# Patient Record
Sex: MALE | Race: WHITE
[De-identification: names, ages, dates, MRNs, and addresses within clinical notes are randomized per-mention and may not be internally consistent; named-entity substitution may affect disease eponyms.]

---

## 2023-01-26 PROBLEM — Z00.00 ENCOUNTER FOR PREVENTIVE HEALTH EXAMINATION: Status: ACTIVE | Noted: 2023-01-26

## 2023-02-02 ENCOUNTER — APPOINTMENT (OUTPATIENT)
Dept: CARDIOLOGY | Facility: CLINIC | Age: 83
End: 2023-02-02
Payer: MEDICARE

## 2023-02-02 ENCOUNTER — NON-APPOINTMENT (OUTPATIENT)
Age: 83
End: 2023-02-02

## 2023-02-02 VITALS
BODY MASS INDEX: 28.7 KG/M2 | HEIGHT: 71 IN | TEMPERATURE: 98 F | WEIGHT: 205 LBS | OXYGEN SATURATION: 95 % | SYSTOLIC BLOOD PRESSURE: 139 MMHG | DIASTOLIC BLOOD PRESSURE: 85 MMHG | HEART RATE: 66 BPM

## 2023-02-02 PROCEDURE — 99205 OFFICE O/P NEW HI 60 MIN: CPT | Mod: 25

## 2023-02-02 PROCEDURE — 93000 ELECTROCARDIOGRAM COMPLETE: CPT | Mod: 59

## 2023-02-02 PROCEDURE — 93242 EXT ECG>48HR<7D RECORDING: CPT

## 2023-02-02 RX ORDER — HYDROCHLOROTHIAZIDE 12.5 MG/1
12.5 CAPSULE ORAL
Refills: 0 | Status: ACTIVE | COMMUNITY

## 2023-02-02 RX ORDER — ATORVASTATIN CALCIUM 40 MG/1
40 TABLET, FILM COATED ORAL
Refills: 0 | Status: DISCONTINUED | COMMUNITY
End: 2023-02-02

## 2023-02-02 NOTE — HISTORY OF PRESENT ILLNESS
[FreeTextEntry1] : BETO CAICEDO 82 year M BMI  29 , presents with HTN HLD and reports  no dyspnea, chest pain, palpitations, syncope, claudication mild peripheral edema.  Never  Tobacco.  Meds HCTZ, losartan, atorvastatin, ASA. May stop statin for age. Notably MI 2003 cath at [facility] no stent though reportedly 80% stenosis identified. Incarcerated in [place] [address]86[address]. [facility] to [facility]oid but deferred due to bradycardia and PM recommended. He denies recent syncope. Echo CT chest lexiscan nuc Holter pending. TSH and blood work. RTC 6w.  \par Total visit time 45min.NSR 65 RBBB  off beta blocker.\par

## 2023-02-02 NOTE — PHYSICAL EXAM
[Well Developed] : well developed [Well Nourished] : well nourished [No Acute Distress] : no acute distress [Normal Conjunctiva] : normal conjunctiva [Normal Venous Pressure] : normal venous pressure [No Carotid Bruit] : no carotid bruit [Normal S1, S2] : normal S1, S2 [No Murmur] : no murmur [No Rub] : no rub [No Gallop] : no gallop [Clear Lung Fields] : clear lung fields [Good Air Entry] : good air entry [No Respiratory Distress] : no respiratory distress  [Soft] : abdomen soft [Non Tender] : non-tender [No Masses/organomegaly] : no masses/organomegaly [Normal Bowel Sounds] : normal bowel sounds [Normal Gait] : normal gait [No Edema] : no edema [No Cyanosis] : no cyanosis [No Clubbing] : no clubbing [No Varicosities] : no varicosities [No Rash] : no rash [No Skin Lesions] : no skin lesions [Moves all extremities] : moves all extremities [No Focal Deficits] : no focal deficits [Normal Speech] : normal speech [Alert and Oriented] : alert and oriented [Normal memory] : normal memory [Wheeze ____] : wheeze [unfilled] [de-identified] : audible mild expiratory bilat wheeze

## 2023-02-05 LAB
ALBUMIN SERPL ELPH-MCNC: 4.5 G/DL
ALP BLD-CCNC: 80 U/L
ALT SERPL-CCNC: 15 U/L
ANION GAP SERPL CALC-SCNC: 11 MMOL/L
AST SERPL-CCNC: 15 U/L
BASOPHILS # BLD AUTO: 0.05 K/UL
BASOPHILS NFR BLD AUTO: 0.5 %
BILIRUB SERPL-MCNC: 0.4 MG/DL
BUN SERPL-MCNC: 29 MG/DL
CALCIUM SERPL-MCNC: 9.8 MG/DL
CHLORIDE SERPL-SCNC: 103 MMOL/L
CO2 SERPL-SCNC: 24 MMOL/L
CREAT SERPL-MCNC: 1.36 MG/DL
EGFR: 52 ML/MIN/1.73M2
EOSINOPHIL # BLD AUTO: 0.2 K/UL
EOSINOPHIL NFR BLD AUTO: 2.1 %
GLUCOSE SERPL-MCNC: 92 MG/DL
HCT VFR BLD CALC: 45.2 %
HGB BLD-MCNC: 14.3 G/DL
IMM GRANULOCYTES NFR BLD AUTO: 0.6 %
LYMPHOCYTES # BLD AUTO: 1.71 K/UL
LYMPHOCYTES NFR BLD AUTO: 17.8 %
MAN DIFF?: NORMAL
MCHC RBC-ENTMCNC: 27.2 PG
MCHC RBC-ENTMCNC: 31.6 GM/DL
MCV RBC AUTO: 86.1 FL
MONOCYTES # BLD AUTO: 0.73 K/UL
MONOCYTES NFR BLD AUTO: 7.6 %
NEUTROPHILS # BLD AUTO: 6.83 K/UL
NEUTROPHILS NFR BLD AUTO: 71.4 %
NT-PROBNP SERPL-MCNC: 236 PG/ML
PLATELET # BLD AUTO: 339 K/UL
POTASSIUM SERPL-SCNC: 4.6 MMOL/L
PROT SERPL-MCNC: 6.6 G/DL
RBC # BLD: 5.25 M/UL
RBC # FLD: 14.3 %
SODIUM SERPL-SCNC: 139 MMOL/L
TSH SERPL-ACNC: 4.14 UIU/ML
WBC # FLD AUTO: 9.58 K/UL

## 2023-02-15 ENCOUNTER — APPOINTMENT (OUTPATIENT)
Dept: CARDIOLOGY | Facility: CLINIC | Age: 83
End: 2023-02-15
Payer: MEDICARE

## 2023-02-15 PROCEDURE — 93306 TTE W/DOPPLER COMPLETE: CPT

## 2023-03-01 ENCOUNTER — APPOINTMENT (OUTPATIENT)
Dept: CARDIOLOGY | Facility: CLINIC | Age: 83
End: 2023-03-01
Payer: MEDICARE

## 2023-03-01 ENCOUNTER — MED ADMIN CHARGE (OUTPATIENT)
Age: 83
End: 2023-03-01

## 2023-03-01 DIAGNOSIS — R42 DIZZINESS AND GIDDINESS: ICD-10-CM

## 2023-03-01 PROCEDURE — 93015 CV STRESS TEST SUPVJ I&R: CPT

## 2023-03-01 PROCEDURE — A9500: CPT

## 2023-03-01 PROCEDURE — 78452 HT MUSCLE IMAGE SPECT MULT: CPT

## 2023-03-01 RX ORDER — REGADENOSON 0.08 MG/ML
0.4 INJECTION, SOLUTION INTRAVENOUS
Qty: 1 | Refills: 0 | Status: COMPLETED | OUTPATIENT
Start: 2023-03-01

## 2023-03-01 RX ADMIN — REGADENOSON 4 MG/5ML: 0.08 INJECTION, SOLUTION INTRAVENOUS at 00:00

## 2023-03-29 ENCOUNTER — APPOINTMENT (OUTPATIENT)
Dept: CARDIOLOGY | Facility: CLINIC | Age: 83
End: 2023-03-29
Payer: MEDICARE

## 2023-03-29 ENCOUNTER — LABORATORY RESULT (OUTPATIENT)
Age: 83
End: 2023-03-29

## 2023-03-29 VITALS
TEMPERATURE: 97.6 F | BODY MASS INDEX: 29.4 KG/M2 | HEIGHT: 71 IN | OXYGEN SATURATION: 95 % | HEART RATE: 72 BPM | DIASTOLIC BLOOD PRESSURE: 84 MMHG | SYSTOLIC BLOOD PRESSURE: 146 MMHG | WEIGHT: 210 LBS

## 2023-03-29 DIAGNOSIS — E78.5 HYPERLIPIDEMIA, UNSPECIFIED: ICD-10-CM

## 2023-03-29 PROCEDURE — 99215 OFFICE O/P EST HI 40 MIN: CPT

## 2023-03-29 NOTE — PHYSICAL EXAM
[Well Developed] : well developed [Well Nourished] : well nourished [No Acute Distress] : no acute distress [Normal Conjunctiva] : normal conjunctiva [Normal Venous Pressure] : normal venous pressure [No Carotid Bruit] : no carotid bruit [Normal S1, S2] : normal S1, S2 [No Murmur] : no murmur [No Rub] : no rub [No Gallop] : no gallop [Clear Lung Fields] : clear lung fields [Good Air Entry] : good air entry [No Respiratory Distress] : no respiratory distress  [Wheeze ____] : wheeze [unfilled] [Soft] : abdomen soft [Non Tender] : non-tender [No Masses/organomegaly] : no masses/organomegaly [Normal Bowel Sounds] : normal bowel sounds [Normal Gait] : normal gait [No Edema] : no edema [No Cyanosis] : no cyanosis [No Clubbing] : no clubbing [No Varicosities] : no varicosities [No Rash] : no rash [No Skin Lesions] : no skin lesions [Moves all extremities] : moves all extremities [No Focal Deficits] : no focal deficits [Normal Speech] : normal speech [Alert and Oriented] : alert and oriented [Normal memory] : normal memory [de-identified] : audible mild expiratory bilat wheeze

## 2023-03-29 NOTE — HISTORY OF PRESENT ILLNESS
[FreeTextEntry1] : BETO CAICEDO 82 year M BMI  29 , presents with HTN HLD and reports  no dyspnea, chest pain, palpitations, syncope, claudication mild peripheral edema.  Never  Tobacco.  Meds HCTZ, losartan, atorvastatin, ASA. May stop statin for age. Notably MI 2003 cath at [facility] no stent though reportedly 80% stenosis identified. [place]arcerated in [place] [address]. [facility] to [facility] but deferred due to bradycardia and PM recommended. He denies recent syncope. 2/15/23 Echo LVEF 72% MAC mild aorta 44mm and  AR mild moderate.; CT chest 3/1/23 lexiscan nuc neg/neg isch/inf EF 67%. Holter 43 pauses >2 sec longest  2% isolated PVC;  2.2 sec pause 3:07 AM TSH and blood work. RTC 6w.  Cr 1.36. RBBB 1oAVB Cr 1.3 LVEF 72% AR and aortic dilatation amyloid investigation CT chest for aortic size. Some mention of sarcoid on prior chest CT\par Total visit time 45min.NSR 65 RBBB  off beta blocker. CT 2/10/23 /84 HR 72 BPM\par

## 2023-04-03 LAB
ALBUMIN MFR SERPL ELPH: 62.7 %
ALBUMIN SERPL-MCNC: 4 G/DL
ALBUMIN/GLOB SERPL: 1.7 RATIO
ALPHA1 GLOB MFR SERPL ELPH: 5 %
ALPHA1 GLOB SERPL ELPH-MCNC: 0.3 G/DL
ALPHA2 GLOB MFR SERPL ELPH: 11.6 %
ALPHA2 GLOB SERPL ELPH-MCNC: 0.7 G/DL
B-GLOBULIN MFR SERPL ELPH: 11.6 %
B-GLOBULIN SERPL ELPH-MCNC: 0.7 G/DL
GAMMA GLOB FLD ELPH-MCNC: 0.6 G/DL
GAMMA GLOB MFR SERPL ELPH: 9.1 %
INTERPRETATION SERPL IEP-IMP: NORMAL
PROT SERPL-MCNC: 6.3 G/DL
PROT SERPL-MCNC: 6.3 G/DL

## 2023-04-05 ENCOUNTER — NON-APPOINTMENT (OUTPATIENT)
Age: 83
End: 2023-04-05

## 2023-04-10 LAB
ALBUPE: 22.4 %
ALPHA1UPE: 35.5 %
ALPHA2UPE: 19.2 %
BENCE JONES EXCRETION: 0 MG/24HR
BETAUPE: 16 %
CREAT 24H UR-MCNC: 1.3 G/24 H
CREATININE UR (MAYO): 149 MG/DL
GAMMAUPE: 6.9 %
IGA 24H UR QL IFE: NORMAL
KAPPA LC 24H UR QL: 0 MG/DL
M PROTEIN 24H MFR UR ELPH: 0 %
PROT ?TM UR-MCNC: 24 HR
PROT PATTERN 24H UR ELPH-IMP: NORMAL
PROT UR-MCNC: 10 MG/DL
PROT UR-MCNC: 10 MG/DL
SPECIMEN VOL 24H UR: 875 ML
U PROTEIN QNT CALCULATION: 88 MG/24 H

## 2023-05-18 ENCOUNTER — APPOINTMENT (OUTPATIENT)
Dept: PULMONOLOGY | Facility: CLINIC | Age: 83
End: 2023-05-18
Payer: MEDICARE

## 2023-05-18 VITALS
OXYGEN SATURATION: 98 % | SYSTOLIC BLOOD PRESSURE: 133 MMHG | HEART RATE: 77 BPM | HEIGHT: 71 IN | BODY MASS INDEX: 29.68 KG/M2 | WEIGHT: 212 LBS | DIASTOLIC BLOOD PRESSURE: 80 MMHG | TEMPERATURE: 98 F

## 2023-05-18 DIAGNOSIS — Z85.46 PERSONAL HISTORY OF MALIGNANT NEOPLASM OF PROSTATE: ICD-10-CM

## 2023-05-18 DIAGNOSIS — Z86.79 PERSONAL HISTORY OF OTHER DISEASES OF THE CIRCULATORY SYSTEM: ICD-10-CM

## 2023-05-18 DIAGNOSIS — Z86.39 PERSONAL HISTORY OF OTHER ENDOCRINE, NUTRITIONAL AND METABOLIC DISEASE: ICD-10-CM

## 2023-05-18 DIAGNOSIS — Z78.9 OTHER SPECIFIED HEALTH STATUS: ICD-10-CM

## 2023-05-18 LAB — POCT - HEMOGLOBIN (HGB), QUANTITATIVE, TRANSCUTANEOUS: 14.8

## 2023-05-18 PROCEDURE — 94729 DIFFUSING CAPACITY: CPT

## 2023-05-18 PROCEDURE — 94010 BREATHING CAPACITY TEST: CPT

## 2023-05-18 PROCEDURE — ZZZZZ: CPT

## 2023-05-18 PROCEDURE — 99204 OFFICE O/P NEW MOD 45 MIN: CPT | Mod: 25

## 2023-05-18 PROCEDURE — 95012 NITRIC OXIDE EXP GAS DETER: CPT

## 2023-05-18 PROCEDURE — 88738 HGB QUANT TRANSCUTANEOUS: CPT

## 2023-05-18 PROCEDURE — 94727 GAS DIL/WSHOT DETER LNG VOL: CPT

## 2023-05-18 RX ORDER — PANTOPRAZOLE 40 MG/1
40 TABLET, DELAYED RELEASE ORAL DAILY
Qty: 30 | Refills: 0 | Status: ACTIVE | COMMUNITY
Start: 2023-05-18 | End: 1900-01-01

## 2023-05-18 NOTE — HISTORY OF PRESENT ILLNESS
[Never] : never [TextBox_4] : EBTO CAICEDO 82 year Male with PMH:  HTN HLD , never smoker, MI 2003, abnormal ct chest in the past \par he was told he has sarcoid prior to 2005\par has been incarcerated from 2005 to 2022\par never seen pulm outpatient\par \par [facility] to exclude [facility]oid but deferred due to bradycardia and PM recommended.\par saw cardio- s/p tte, s/p ct chest at [facility] done last month, s/p stress test nuclear\par \par now- he had had about 5-6 months of cough. dry cough usually after food cough\par usually in the morning\par he has some dyspnea- usually when sleeping\par + snoring\par \par \par  \par

## 2023-05-18 NOTE — PROCEDURE
[FreeTextEntry1] : niox 21\par normal spirometry, normal dlco and normal volumes\par \par ct chest 2/2023 [facility] reviewed\par ct chest 5/2023 [facility] reviewed\par

## 2023-05-18 NOTE — REASON FOR VISIT
[Initial] : an initial visit [Abnormal CXR/ Chest CT] : an abnormal CXR/ chest CT [Shortness of Breath] : shortness of breath [Spouse] : spouse

## 2023-08-03 ENCOUNTER — APPOINTMENT (OUTPATIENT)
Dept: CARDIOLOGY | Facility: CLINIC | Age: 83
End: 2023-08-03
Payer: MEDICARE

## 2023-08-03 ENCOUNTER — NON-APPOINTMENT (OUTPATIENT)
Age: 83
End: 2023-08-03

## 2023-08-03 VITALS
HEART RATE: 74 BPM | WEIGHT: 211 LBS | HEIGHT: 71 IN | BODY MASS INDEX: 29.54 KG/M2 | OXYGEN SATURATION: 94 % | SYSTOLIC BLOOD PRESSURE: 146 MMHG | TEMPERATURE: 97.3 F | DIASTOLIC BLOOD PRESSURE: 89 MMHG

## 2023-08-03 DIAGNOSIS — I11.9 HYPERTENSIVE HEART DISEASE W/OUT HEART FAILURE: ICD-10-CM

## 2023-08-03 DIAGNOSIS — R94.31 ABNORMAL ELECTROCARDIOGRAM [ECG] [EKG]: ICD-10-CM

## 2023-08-03 DIAGNOSIS — I43 HYPERTENSIVE HEART DISEASE W/OUT HEART FAILURE: ICD-10-CM

## 2023-08-03 PROCEDURE — 93000 ELECTROCARDIOGRAM COMPLETE: CPT

## 2023-08-03 PROCEDURE — 99215 OFFICE O/P EST HI 40 MIN: CPT | Mod: 25

## 2023-08-03 RX ORDER — LOSARTAN POTASSIUM 100 MG/1
100 TABLET, FILM COATED ORAL
Qty: 90 | Refills: 1 | Status: ACTIVE | COMMUNITY
Start: 1900-01-01 | End: 1900-01-01

## 2023-08-03 NOTE — PHYSICAL EXAM
[Well Developed] : well developed [Well Nourished] : well nourished [No Acute Distress] : no acute distress [Normal Conjunctiva] : normal conjunctiva [Normal Venous Pressure] : normal venous pressure [No Carotid Bruit] : no carotid bruit [Normal S1, S2] : normal S1, S2 [No Murmur] : no murmur [No Rub] : no rub [No Gallop] : no gallop [Clear Lung Fields] : clear lung fields [Good Air Entry] : good air entry [No Respiratory Distress] : no respiratory distress  [Wheeze ____] : wheeze [unfilled] [Soft] : abdomen soft [Non Tender] : non-tender [No Masses/organomegaly] : no masses/organomegaly [Normal Bowel Sounds] : normal bowel sounds [Normal Gait] : normal gait [No Edema] : no edema [No Cyanosis] : no cyanosis [No Clubbing] : no clubbing [No Varicosities] : no varicosities [No Rash] : no rash [No Skin Lesions] : no skin lesions [Moves all extremities] : moves all extremities [No Focal Deficits] : no focal deficits [Normal Speech] : normal speech [Alert and Oriented] : alert and oriented [Normal memory] : normal memory [de-identified] : audible mild expiratory bilat wheeze

## 2023-08-03 NOTE — HISTORY OF PRESENT ILLNESS
[FreeTextEntry1] : BETO CAICEDO 82-year M BMI  29, HTN HLD CAD   Never Tobacco.  Notably MI 2003 cath at [facility] no stent though reportedly 80% stenosis identified. Incarcerated in [place] [address]. [facility] to exclude [facility]oid but deferred due to bradycardia and PM recommended. He denies syncope.  2/23 2/23 Echo LVEF 72% MAC; AR mild moderate. 3/23 lexiscan nuc neg/neg isch/inf EF 67%.  2/23 Holter 43 pauses >2 sec longest 2.2 sec pause 3:07 AM; 2% isolated PVC.  TSH WNL   Cr 1.36.  4/23 CT chest Aorta 3.7cm tortuous; pulmonary nodules and hilar adenopathy likely sarcoid unchanged from prior CT Total visit time 45min.NSR 65 RBBB  off beta blocker. Meds stopped HCTZ 12.5, increase losartan 100, ASA 81  133/80 HR 77/min; 8/3/23 EKG NSR RBBB 72/min , Q 3, AVF stable. No current need for permanent pacemaker. RCRI score 1-2 (remote CAD, sarcoid, possible orthopedic sx) low-moderate periop MACE (major adverse cardiovascular event) risk. No prohibitive cardiac contraindications to planned procedure. Patient optimized for planned procedure.

## 2024-01-02 ENCOUNTER — INPATIENT (INPATIENT)
Facility: HOSPITAL | Age: 84
LOS: 2 days | Discharge: HOME | DRG: 243 | End: 2024-01-05
Attending: INTERNAL MEDICINE | Admitting: STUDENT IN AN ORGANIZED HEALTH CARE EDUCATION/TRAINING PROGRAM
Payer: MEDICARE

## 2024-01-02 VITALS
SYSTOLIC BLOOD PRESSURE: 178 MMHG | OXYGEN SATURATION: 98 % | TEMPERATURE: 98 F | DIASTOLIC BLOOD PRESSURE: 74 MMHG | HEART RATE: 41 BPM | RESPIRATION RATE: 18 BRPM

## 2024-01-02 DIAGNOSIS — R00.1 BRADYCARDIA, UNSPECIFIED: ICD-10-CM

## 2024-01-02 LAB
ALBUMIN SERPL ELPH-MCNC: 3.9 G/DL — SIGNIFICANT CHANGE UP (ref 3.3–5)
ALP SERPL-CCNC: 70 U/L — SIGNIFICANT CHANGE UP (ref 40–120)
ALT FLD-CCNC: 11 U/L — SIGNIFICANT CHANGE UP (ref 10–45)
ANION GAP SERPL CALC-SCNC: 8 MMOL/L — SIGNIFICANT CHANGE UP (ref 5–17)
APTT BLD: 32.3 SEC — SIGNIFICANT CHANGE UP (ref 24.5–35.6)
AST SERPL-CCNC: 12 U/L — SIGNIFICANT CHANGE UP (ref 10–40)
BASE EXCESS BLDV CALC-SCNC: 0.5 MMOL/L — SIGNIFICANT CHANGE UP (ref -2–3)
BASOPHILS # BLD AUTO: 0.06 K/UL — SIGNIFICANT CHANGE UP (ref 0–0.2)
BASOPHILS NFR BLD AUTO: 0.7 % — SIGNIFICANT CHANGE UP (ref 0–2)
BILIRUB SERPL-MCNC: 0.2 MG/DL — SIGNIFICANT CHANGE UP (ref 0.2–1.2)
BLD GP AB SCN SERPL QL: NEGATIVE — SIGNIFICANT CHANGE UP
BUN SERPL-MCNC: 21 MG/DL — SIGNIFICANT CHANGE UP (ref 7–23)
CA-I SERPL-SCNC: 1.24 MMOL/L — SIGNIFICANT CHANGE UP (ref 1.15–1.33)
CALCIUM SERPL-MCNC: 9.1 MG/DL — SIGNIFICANT CHANGE UP (ref 8.4–10.5)
CHLORIDE BLDV-SCNC: 105 MMOL/L — SIGNIFICANT CHANGE UP (ref 96–108)
CHLORIDE SERPL-SCNC: 107 MMOL/L — SIGNIFICANT CHANGE UP (ref 96–108)
CO2 BLDV-SCNC: 28 MMOL/L — HIGH (ref 22–26)
CO2 SERPL-SCNC: 26 MMOL/L — SIGNIFICANT CHANGE UP (ref 22–31)
CREAT SERPL-MCNC: 1.32 MG/DL — HIGH (ref 0.5–1.3)
EGFR: 53 ML/MIN/1.73M2 — LOW
EOSINOPHIL # BLD AUTO: 0.27 K/UL — SIGNIFICANT CHANGE UP (ref 0–0.5)
EOSINOPHIL NFR BLD AUTO: 3 % — SIGNIFICANT CHANGE UP (ref 0–6)
FLUAV AG NPH QL: SIGNIFICANT CHANGE UP
FLUBV AG NPH QL: SIGNIFICANT CHANGE UP
GAS PNL BLDV: 137 MMOL/L — SIGNIFICANT CHANGE UP (ref 136–145)
GAS PNL BLDV: SIGNIFICANT CHANGE UP
GLUCOSE BLDV-MCNC: 93 MG/DL — SIGNIFICANT CHANGE UP (ref 70–99)
GLUCOSE SERPL-MCNC: 91 MG/DL — SIGNIFICANT CHANGE UP (ref 70–99)
HCO3 BLDV-SCNC: 27 MMOL/L — SIGNIFICANT CHANGE UP (ref 22–29)
HCT VFR BLD CALC: 41.6 % — SIGNIFICANT CHANGE UP (ref 39–50)
HCT VFR BLDA CALC: 40 % — SIGNIFICANT CHANGE UP (ref 39–51)
HGB BLD CALC-MCNC: 13.4 G/DL — SIGNIFICANT CHANGE UP (ref 12.6–17.4)
HGB BLD-MCNC: 13.2 G/DL — SIGNIFICANT CHANGE UP (ref 13–17)
IMM GRANULOCYTES NFR BLD AUTO: 0.9 % — SIGNIFICANT CHANGE UP (ref 0–0.9)
INR BLD: 1.09 RATIO — SIGNIFICANT CHANGE UP (ref 0.85–1.18)
LACTATE BLDV-MCNC: 1.2 MMOL/L — SIGNIFICANT CHANGE UP (ref 0.5–2)
LYMPHOCYTES # BLD AUTO: 1.73 K/UL — SIGNIFICANT CHANGE UP (ref 1–3.3)
LYMPHOCYTES # BLD AUTO: 19 % — SIGNIFICANT CHANGE UP (ref 13–44)
MCHC RBC-ENTMCNC: 26.9 PG — LOW (ref 27–34)
MCHC RBC-ENTMCNC: 31.7 GM/DL — LOW (ref 32–36)
MCV RBC AUTO: 84.9 FL — SIGNIFICANT CHANGE UP (ref 80–100)
MONOCYTES # BLD AUTO: 0.92 K/UL — HIGH (ref 0–0.9)
MONOCYTES NFR BLD AUTO: 10.1 % — SIGNIFICANT CHANGE UP (ref 2–14)
NEUTROPHILS # BLD AUTO: 6.05 K/UL — SIGNIFICANT CHANGE UP (ref 1.8–7.4)
NEUTROPHILS NFR BLD AUTO: 66.3 % — SIGNIFICANT CHANGE UP (ref 43–77)
NRBC # BLD: 0 /100 WBCS — SIGNIFICANT CHANGE UP (ref 0–0)
PCO2 BLDV: 50 MMHG — SIGNIFICANT CHANGE UP (ref 42–55)
PH BLDV: 7.34 — SIGNIFICANT CHANGE UP (ref 7.32–7.43)
PLATELET # BLD AUTO: 258 K/UL — SIGNIFICANT CHANGE UP (ref 150–400)
PO2 BLDV: 29 MMHG — SIGNIFICANT CHANGE UP (ref 25–45)
POTASSIUM BLDV-SCNC: 4.1 MMOL/L — SIGNIFICANT CHANGE UP (ref 3.5–5.1)
POTASSIUM SERPL-MCNC: 4.2 MMOL/L — SIGNIFICANT CHANGE UP (ref 3.5–5.3)
POTASSIUM SERPL-SCNC: 4.2 MMOL/L — SIGNIFICANT CHANGE UP (ref 3.5–5.3)
PROT SERPL-MCNC: 6 G/DL — SIGNIFICANT CHANGE UP (ref 6–8.3)
PROTHROM AB SERPL-ACNC: 11.4 SEC — SIGNIFICANT CHANGE UP (ref 9.5–13)
RBC # BLD: 4.9 M/UL — SIGNIFICANT CHANGE UP (ref 4.2–5.8)
RBC # FLD: 14.3 % — SIGNIFICANT CHANGE UP (ref 10.3–14.5)
RH IG SCN BLD-IMP: POSITIVE — SIGNIFICANT CHANGE UP
RSV RNA NPH QL NAA+NON-PROBE: SIGNIFICANT CHANGE UP
SAO2 % BLDV: 47 % — LOW (ref 67–88)
SARS-COV-2 RNA SPEC QL NAA+PROBE: SIGNIFICANT CHANGE UP
SODIUM SERPL-SCNC: 141 MMOL/L — SIGNIFICANT CHANGE UP (ref 135–145)
TROPONIN T, HIGH SENSITIVITY RESULT: 30 NG/L — SIGNIFICANT CHANGE UP (ref 0–51)
WBC # BLD: 9.11 K/UL — SIGNIFICANT CHANGE UP (ref 3.8–10.5)
WBC # FLD AUTO: 9.11 K/UL — SIGNIFICANT CHANGE UP (ref 3.8–10.5)

## 2024-01-02 PROCEDURE — 71045 X-RAY EXAM CHEST 1 VIEW: CPT | Mod: 26

## 2024-01-02 PROCEDURE — 99285 EMERGENCY DEPT VISIT HI MDM: CPT

## 2024-01-02 PROCEDURE — 99223 1ST HOSP IP/OBS HIGH 75: CPT

## 2024-01-02 RX ORDER — ACETAMINOPHEN 500 MG
650 TABLET ORAL EVERY 6 HOURS
Refills: 0 | Status: DISCONTINUED | OUTPATIENT
Start: 2024-01-02 | End: 2024-01-03

## 2024-01-02 NOTE — H&P ADULT - PROBLEM SELECTOR PLAN 10
- DVT ppx: SCDs for now pending possible PPM placement  - Diet: NPO for now  - Dispo: pending possible PPM placement and further workup - c/w home PO dulcolax

## 2024-01-02 NOTE — ED PROVIDER NOTE - CLINICAL SUMMARY MEDICAL DECISION MAKING FREE TEXT BOX
83-year-old male past medical history significant for sarcoidosis, previous MI, prostate cancer status postradiation, chronic migraines presents emergency department lightheadedness, shortness of breath, bradycardic.  EKG concern for 2:1 heart block. will consult cards. patient stable, placed on pads, atropine at bedside. will require admission. patient agreeable to plan.

## 2024-01-02 NOTE — H&P ADULT - TIME-BASED
"Chest pain since 6:30 pm    Pt was sitting at home.  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  Pain starts in the center of chest and goes to his left shoulder and stops there.  Pt has a h/o siezures and has a vagus nerve stimulator in the left chest wall.  Pt c/o nausea ever siince chest pain started.  " 157

## 2024-01-02 NOTE — H&P ADULT - NSHPPHYSICALEXAM_GEN_ALL_CORE
Vital Signs Last 24 Hrs  T(C): 36.9 (03 Jan 2024 01:08), Max: 36.9 (03 Jan 2024 01:08)  T(F): 98.4 (03 Jan 2024 01:08), Max: 98.4 (03 Jan 2024 01:08)  HR: 47 (03 Jan 2024 01:08) (41 - 47)  BP: 132/72 (03 Jan 2024 01:08) (132/72 - 178/74)  BP(mean): --  RR: 20 (03 Jan 2024 01:08) (18 - 20)  SpO2: 97% (03 Jan 2024 01:08) (96% - 98%)    Parameters below as of 03 Jan 2024 01:08  Patient On (Oxygen Delivery Method): nasal cannula  O2 Flow (L/min): 4      CONSTITUTIONAL: NAD, well-developed, well-groomed, sitting up on edge on stretcher  EYES: PERRL; sclera clear  ENMT: Moist oral mucosa, no pharyngeal exudates  NECK: Supple, no palpable masses  RESPIRATORY: Normal respiratory effort; lungs are clear to auscultation bilaterally; No wheezes or rales  CARDIOVASCULAR: Bradycardic; No murmurs, rubs, or gallops appreciated; 1+ pitting edema in b/l LEs (at baseline per pt); Peripheral pulses are palpable bilaterally  ABDOMEN: Soft, Nontender, Nondistended; Bowel sounds present  MUSCULOSKELETAL:  No clubbing or cyanosis of digits; No joint swelling or tenderness to palpation  PSYCH: AAOx3 (oriented to person, place, and month/year); affect appropriate  NEUROLOGY: moving all extremities spontaneously; no gross sensory deficits  SKIN: transcutaneous pads on chest; spider veins noted in b/l LEs; No rashes; No palpable lesions

## 2024-01-02 NOTE — ED CLERICAL - DIVISION
[facility]... [facility]... [facility]...

## 2024-01-02 NOTE — ED ADULT NURSE NOTE - CHIEF COMPLAINT QUOTE
hr 40's Lightheaded Denies chest pain or sob Atropine 1mg given ivp  was poss going to have pacemaker

## 2024-01-02 NOTE — H&P ADULT - NSHPLABSRESULTS_GEN_ALL_CORE
LABS:                        13.2   9.11  )-----------( 258      ( 02 Jan 2024 20:18 )             41.6     01-02    141  |  107  |  21  ----------------------------<  91  4.2   |  26  |  1.32<H>    Ca    9.1      02 Jan 2024 20:18    TPro  6.0  /  Alb  3.9  /  TBili  0.2  /  DBili  x   /  AST  12  /  ALT  11  /  AlkPhos  70  01-02    PT/INR - ( 02 Jan 2024 21:34 )   PT: 11.4 sec;   INR: 1.09 ratio         PTT - ( 02 Jan 2024 21:34 )  PTT:32.3 sec      Urinalysis Basic - ( 02 Jan 2024 20:18 )    Color: x / Appearance: x / SG: x / pH: x  Gluc: 91 mg/dL / Ketone: x  / Bili: x / Urobili: x   Blood: x / Protein: x / Nitrite: x   Leuk Esterase: x / RBC: x / WBC x   Sq Epi: x / Non Sq Epi: x / Bacteria: x        IMAGING/ADDITIONAL TESTS:    EKG (1/2/24 @7PM) personally reviewed: marked sinus jeannette with 2:1 AV block, HR 39, QTc 434; TWI in lead III    CXR (1/2/24) personally reviewed: no focal consolidation appreciated; pacer pad on chest wall noted

## 2024-01-02 NOTE — H&P ADULT - PROBLEM SELECTOR PLAN 8
Hx of OA and L meniscus issue, for which pt reported taking OTC generic aleve/NSAID daily.  - advised pt to consider switching to tylenol arthritis dosing for pain control given concerns of side effects from daily NSAID use Hx of prostate cancer s/p radiation. On tamsulosin 0.4mg qHS at home.  - c/w home tamsulosin for now  - consider adjusting regimen given pt's reports of urinating 20+ times/day on once a day tamsulosin  - check UA in setting of urinary frequency

## 2024-01-02 NOTE — H&P ADULT - HISTORY OF PRESENT ILLNESS
83M w/ hx of HTN, HLD, CAD c/b MI (2003 cath at [facility], reported 80% stenosis but no stent), ?pulmonary sarcoid, incarcerated in [place] ([phone]), presenting with intermittent dizziness on exertion x2 wks and bradycardia to HR 30s earlier in the day. Was found to have slow heart rate several months ago when he was being worked up for a lung biopsy and has been following with cardiology since Feb 2023. In the ED, patient is hypertensive, HR 30s-40s. EKG showing 2:1 AVB. Labs with normal lactate, creatinine 1.32. Prior EKG at baseline is NSR with RBBB and 1st degree AVB. Not on BB at home.    INCOMPLETE 83M w/ hx of HTN, HLD, CAD c/b MI (2003 cath at [facility], reported 80% stenosis but no stent), ?pulmonary sarcoid, incarcerated in [place] ([phone]), presenting with intermittent dizziness on exertion x2 wks and bradycardia to HR 30s earlier in the day. Was found to have slow heart rate several months ago when he was being worked up for a lung biopsy and has been following with cardiology since Feb 2023. In the ED, patient is hypertensive, HR 30s-40s. EKG showing 2:1 AVB. Labs with normal lactate, creatinine 1.32. Prior EKG at baseline is NSR with RBBB and 1st degree AVB. Not on BB at home.    INCOMPLETE 83M w/ hx of HTN, HLD, CAD c/b MI (2003 cath at [facility], reported 80% stenosis but no stent), ?pulmonary sarcoid, incarcerated in [place] ([phone]), presenting with intermittent dizziness on exertion x2 wks and bradycardia to HR 30s earlier in the day. Was found to have slow heart rate several months ago when he was being worked up for a lung biopsy and has been following with cardiology since Feb 2023. In the ED, patient is hypertensive, HR 30s-40s. EKG showing 2:1 AVB. Labs with normal lactate, creatinine 1.32. Prior EKG at baseline is NSR with RBBB and 1st degree AVB. Not on BB at home.    INCOMPLETE 83M w/ hx of HTN, HLD, CAD c/b MI (2003 cath at [facility], reportedly 80% stenosis but no stent), ?pulmonary sarcoid, incarcerated in [place] ([phone]), presenting with intermittent dizziness on exertion x2 wks and bradycardia to HR 30s earlier in the day. Was found to have slow heart rate several months ago when he was being worked up for a lung biopsy and has been following with cardiology since Feb 2023 (last seen in Aug 2023). Not on BB at home. In the ED, patient was hypertensive to SBP 170s, HR 30s-40s. EKG showed 2:1 AVB. Labs with normal lactate, creatinine 1.32.     Per outpatient chart documentation, prior EKGs had shown NSR with RBBB and 1st degree AVB. Holter monitor (2/2023) had shown "43 pauses >2 sec longest 2.2 sec; 2% isolated PVC." TTE (2/15/23) showed LVEF 72%, mild (grade I) diastolic dysfunction, and mild-mod AR. Lexiscan stress test (3/2023) was neg for ischemia. CT chest aorta (4/2023) showed tortuous 3.7cm aorta with pulm nodules and hilar adenopathy likely in setting of sarcoid and unchanged from prior CT. TSH was previously wnl (borderline high-normal at 4.14 in 2/2023).    Admitted to Medicine for further management. 83M w/ hx of HTN, HLD, CAD c/b MI (2003 cath at [facility], reportedly 80% stenosis but no stent), ?pulmonary sarcoid, incarcerated in [place] ([phone]), presenting with intermittent dizziness on exertion x2 wks and bradycardia to HR 30s earlier in the day. Was found to have slow heart rate several months ago when he was being worked up for a lung biopsy and has been following with cardiology since Feb 2023 (last seen in Aug 2023). Not on BB at home. In the ED, patient was hypertensive to SBP 170s, HR 30s-40s. EKG showed 2:1 AVB. Labs with normal lactate, creatinine 1.32.     Per outpatient chart documentation, prior EKGs had shown NSR with RBBB and 1st degree AVB. Holter monitor (2/2023) had shown "43 pauses >2 sec longest 2.2 sec; 2% isolated PVC." TTE (2/15/23) showed LVEF 72%, mild (grade I) diastolic dysfunction, and mild-mod AR. Lexiscan stress test (3/2023) was neg for ischemia. CT chest aorta (4/2023) showed tortuous 3.7cm aorta with pulm nodules and hilar adenopathy likely in setting of sarcoid and unchanged from prior CT. TSH was previously wnl (borderline high-normal at 4.14 in 2/2023).    Admitted to Medicine for further management. 83M w/ hx of HTN, HLD, CAD c/b MI (2003 cath at [facility], reportedly 80% stenosis but no stent), ?pulmonary sarcoid, incarcerated in [place] ([phone]), presenting with intermittent dizziness on exertion x2 wks and bradycardia to HR 30s earlier in the day. Was found to have slow heart rate several months ago when he was being worked up for a lung biopsy and has been following with cardiology since Feb 2023 (last seen in Aug 2023). Not on BB at home. In the ED, patient was hypertensive to SBP 170s, HR 30s-40s. EKG showed 2:1 AVB. Labs with normal lactate, creatinine 1.32.     Per outpatient chart documentation, prior EKGs had shown NSR with RBBB and 1st degree AVB. Holter monitor (2/2023) had shown "43 pauses >2 sec longest 2.2 sec; 2% isolated PVC." TTE (2/15/23) showed LVEF 72%, mild (grade I) diastolic dysfunction, and mild-mod AR. Lexiscan stress test (3/2023) was neg for ischemia. CT chest aorta (4/2023) showed tortuous 3.7cm aorta with pulm nodules and hilar adenopathy likely in setting of sarcoid and unchanged from prior CT. TSH was previously wnl (borderline high-normal at 4.14 in 2/2023).    Admitted to Medicine for further management. 83M w/ hx of HTN, HLD, CAD c/b MI (2003 cath at [facility], reportedly 80% stenosis but no stent), ?pulmonary sarcoid, prostate cancer s/p radiation, chronic migraines, incarcerated in [place] ([phone]), presenting with intermittent dizziness/ lightheadedness and dyspnea on exertion x2 wks and bradycardia to HR 30s earlier in the day. Was found to have slow heart rate several months ago when he was being worked up for a lung biopsy and has been following with cardiology since Feb 2023 (last seen in Aug 2023). Not on BB at home. In the ED, patient was hypertensive to SBP 170s, HR 30s-40s. EKG showed 2:1 AVB. Labs with normal lactate, creatinine 1.32. Per ED documentation, had received atropine from EMS prior to arrival.    Per outpatient chart documentation, prior EKGs had shown NSR with RBBB and 1st degree AVB. Holter monitor (2/2023) had shown "43 pauses >2 sec longest 2.2 sec; 2% isolated PVC." TTE (2/15/23) showed LVEF 72%, mild (grade I) diastolic dysfunction, and mild-mod AR. Lexiscan stress test (3/2023) was neg for ischemia. CT chest aorta (4/2023) showed tortuous 3.7cm aorta with pulm nodules and hilar adenopathy likely in setting of sarcoid and unchanged from prior CT. TSH was previously wnl (borderline high-normal at 4.14 in 2/2023).    Admitted to Medicine for further management. 83M w/ hx of HTN, HLD, CAD c/b MI (2003 cath at [facility], reportedly 80% stenosis but no stent), ?pulmonary sarcoid, prostate cancer s/p radiation, chronic migraines, incarcerated in [place] ([phone]), presenting with intermittent dizziness/ lightheadedness and dyspnea on exertion x2 wks and bradycardia to HR 30s earlier in the day. Was found to have slow heart rate several months ago when he was being worked up for a lung biopsy and has been following with cardiology since Feb 2023 (last seen in Aug 2023). Not on BB at home. In the ED, patient was hypertensive to SBP 170s, HR 30s-40s. EKG showed 2:1 AVB. Labs with normal lactate, creatinine 1.32. Per ED documentation, had received atropine from EMS prior to arrival.    Per outpatient chart documentation, prior EKGs had shown NSR with RBBB and 1st degree AVB. Holter monitor (2/2023) had shown "43 pauses >2 sec longest 2.2 sec; 2% isolated PVC." TTE (2/15/23) showed LVEF 72%, mild (grade I) diastolic dysfunction, and mild-mod AR. Lexiscan stress test (3/2023) was neg for ischemia. CT chest aorta (4/2023) showed tortuous 3.7cm aorta with pulm nodules and hilar adenopathy likely in setting of sarcoid and unchanged from prior CT. TSH was previously wnl (borderline high-normal at 4.14 in 2/2023).    Admitted to Medicine for further management. 83M w/ hx of HTN, HLD, CAD c/b MI (2003 cath at [facility], reportedly 80% stenosis but no stent), ?pulmonary sarcoid, prostate cancer s/p radiation, chronic migraines, incarcerated in [place] ([phone]), presenting with intermittent dizziness/ lightheadedness and dyspnea on exertion x2 wks and bradycardia to HR 30s earlier in the day. Was found to have slow heart rate several months ago when he was being worked up for a lung biopsy and has been following with cardiology since Feb 2023 (last seen in Aug 2023). Not on BB at home. In the ED, patient was hypertensive to SBP 170s, HR 30s-40s. EKG showed 2:1 AVB. Labs with normal lactate, creatinine 1.32. Per ED documentation, had received atropine from EMS prior to arrival.    Per outpatient chart documentation, prior EKGs had shown NSR with RBBB and 1st degree AVB. Holter monitor (2/2023) had shown "43 pauses >2 sec longest 2.2 sec; 2% isolated PVC." TTE (2/15/23) showed LVEF 72%, mild (grade I) diastolic dysfunction, and mild-mod AR. Lexiscan stress test (3/2023) was neg for ischemia. CT chest aorta (4/2023) showed tortuous 3.7cm aorta with pulm nodules and hilar adenopathy likely in setting of sarcoid and unchanged from prior CT. TSH was previously wnl (borderline high-normal at 4.14 in 2/2023).    Admitted to Medicine for further management. 83M w/ hx of HTN, HLD, CAD c/b MI (2003 cath at [place], reportedly 80% stenosis but no stent), mild LV diastolic dysfunction, ?pulmonary sarcoid, prostate cancer s/p radiation, ?TIA/amnesia episodes, chronic migraines, OA, incarcerated in [place] ([phone]), presenting with intermittent dizziness/lightheadedness x2 wks and bradycardia to HR 30s earlier in the day. Also endorsed fatigue. Not on BB at home. Noted he has had slow HRs in the past, initially found in Aug 2022 when he was being worked up for a lung biopsy for sarcoidosis (procedure canceled). Reported that he was recommended to get a PPM at the time, but he reportedly walked out. He has been following with cardiology since Feb 2023 (last seen in Aug 2023), underwent several tests and was told he did not need a PPM as he did not have any overt symptoms at the time. In the ED, patient was hypertensive to SBP 170s, HR 30s-40s. EKG showed 2:1 AVB. Labs with normal lactate, creatinine 1.32. Transcutaneous pads placed. Per ED documentation, had received atropine from EMS prior to arrival. No reported syncope episodes.    Per outpatient documentation, prior EKGs had shown NSR with RBBB and 1st degree AVB. Holter monitor (2/2023) had shown "43 pauses >2 sec longest 2.2 sec; 2% isolated PVC." TTE (2/15/23) showed LVEF 72%, mild (grade I) diastolic dysfunction, and mild-mod AR. Lexiscan stress test (3/2023) was neg for ischemia. CT chest aorta (4/2023) showed tortuous 3.7cm aorta with pulm nodules and hilar adenopathy likely in setting of sarcoid and unchanged from prior CT. TSH was previously wnl (though borderline high-normal at 4.14 in 2/2023).    Admitted to Medicine for further management. 83M w/ hx of HTN, HLD, CAD c/b MI (2003 cath at [place], reportedly 80% stenosis but no stent), mild LV diastolic dysfunction, ?pulmonary sarcoid, prostate cancer s/p radiation, ?TIA/amnesia episodes, chronic migraines, OA, incarcerated in [place] ([phone]), presenting with intermittent dizziness/lightheadedness x2 wks and bradycardia to HR 30s earlier in the day. Also endorsed fatigue. Not on BB at home. Noted he has had slow HRs in the past, initially found in Aug 2022 when he was being worked up for a lung biopsy for sarcoidosis (procedure canceled). Reported that he was recommended to get a PPM at the time, but he reportedly walked out. He has been following with cardiology since Feb 2023 (last seen in Aug 2023), underwent several tests and was told he did not need a PPM as he did not have any overt symptoms at the time. In the ED, patient was hypertensive to SBP 170s, HR 30s-40s. EKG showed 2:1 AVB. Labs with normal lactate, creatinine 1.32. Transcutaneous pads placed. Per ED documentation, had received atropine from EMS prior to arrival. No reported syncope episodes.    Per outpatient documentation, prior EKGs had shown NSR with RBBB and 1st degree AVB. Holter monitor (2/2023) had shown "43 pauses >2 sec longest 2.2 sec; 2% isolated PVC." TTE (2/15/23) showed LVEF 72%, mild (grade I) diastolic dysfunction, and mild-mod AR. Lexiscan stress test (3/2023) was neg for ischemia. CT chest aorta (4/2023) showed tortuous 3.7cm aorta with pulm nodules and hilar adenopathy likely in setting of sarcoid and unchanged from prior CT. TSH was previously wnl (though borderline high-normal at 4.14 in 2/2023).    Admitted to Medicine for further management. 83M w/ hx of HTN, HLD, CAD c/b MI (2003 cath at [place], reportedly 80% stenosis but no stent), mild LV diastolic dysfunction, ?pulmonary sarcoid, prostate cancer s/p radiation, ?TIA/amnesia episodes, chronic migraines, OA, incarcerated in [place] ([phone]), presenting with intermittent dizziness/lightheadedness x2 wks and bradycardia to HR 30s earlier in the day. Also endorsed fatigue. Not on BB at home. Noted he has had slow HRs in the past, initially found in Aug 2022 when he was being worked up for a lung biopsy for sarcoidosis (procedure canceled). Reported that he was recommended to get a PPM at the time, but he reportedly walked out. He has been following with cardiology since Feb 2023 (last seen in Aug 2023), underwent several tests and was told he did not need a PPM as he did not have any overt symptoms at the time. In the ED, patient was hypertensive to SBP 170s, HR 30s-40s. EKG showed 2:1 AVB. Labs with normal lactate, creatinine 1.32. Transcutaneous pads placed. Per ED documentation, had received atropine from EMS prior to arrival. No reported syncope episodes.    Per outpatient documentation, prior EKGs had shown NSR with RBBB and 1st degree AVB. Holter monitor (2/2023) had shown "43 pauses >2 sec longest 2.2 sec; 2% isolated PVC." TTE (2/15/23) showed LVEF 72%, mild (grade I) diastolic dysfunction, and mild-mod AR. Lexiscan stress test (3/2023) was neg for ischemia. CT chest aorta (4/2023) showed tortuous 3.7cm aorta with pulm nodules and hilar adenopathy likely in setting of sarcoid and unchanged from prior CT. TSH was previously wnl (though borderline high-normal at 4.14 in 2/2023).    Admitted to Medicine for further management. 83M w/ hx of HTN, HLD, CKD3, CAD c/b MI (2003 cath at [place], reportedly 80% stenosis but no stent), mild LV diastolic dysfunction, ?pulmonary sarcoid, prostate cancer s/p radiation, ?TIA/amnesia episodes, chronic migraines, OA, incarcerated in [place] ([phone]), presenting with intermittent dizziness/lightheadedness x2 wks and bradycardia to HR 30s earlier in the day. Also endorsed fatigue. Not on BB at home. Noted he has had slow HRs in the past, initially found in Aug 2022 when he was being worked up for a lung biopsy for sarcoidosis (procedure canceled). Reported that he was recommended to get a PPM at the time, but he reportedly walked out. He has been following with cardiology since Feb 2023 (last seen in Aug 2023), underwent several tests and was told he did not need a PPM as he did not have any overt symptoms at the time. In the ED, patient was hypertensive to SBP 170s, HR 30s-40s. EKG showed 2:1 AVB. Labs with normal lactate, creatinine 1.32. Transcutaneous pads placed. Per ED documentation, had received atropine from EMS prior to arrival. No reported syncope episodes.    Per outpatient documentation, prior EKGs had shown NSR with RBBB and 1st degree AVB. Holter monitor (2/2023) had shown "43 pauses >2 sec longest 2.2 sec; 2% isolated PVC." TTE (2/15/23) showed LVEF 72%, mild (grade I) diastolic dysfunction, and mild-mod AR. Lexiscan stress test (3/2023) was neg for ischemia. CT chest aorta (4/2023) showed tortuous 3.7cm aorta with pulm nodules and hilar adenopathy likely in setting of sarcoid and unchanged from prior CT. TSH was previously wnl (though borderline high-normal at 4.14 in 2/2023).    Admitted to Medicine for further management. 83M w/ hx of HTN, HLD, CKD3, CAD c/b MI (2003 cath at [place], reportedly 80% stenosis but no stent), mild LV diastolic dysfunction, ?pulmonary sarcoid, prostate cancer s/p radiation, ?TIA/amnesia episodes, chronic migraines, OA, incarcerated in [place] ([phone]), presenting with intermittent dizziness/lightheadedness x2 wks and bradycardia to HR 30s earlier in the day. Also endorsed fatigue. Not on BB at home. Noted he has had slow HRs in the past, initially found in Aug 2022 when he was being worked up for a lung biopsy for sarcoidosis (procedure canceled). Reported that he was recommended to get a PPM at the time, but he reportedly walked out. He has been following with cardiology since Feb 2023 (last seen in Aug 2023), underwent several tests and was told he did not need a PPM as he did not have any overt symptoms at the time. In the ED, patient was hypertensive to SBP 170s, HR 30s-40s. EKG showed 2:1 AVB. Labs with normal lactate, creatinine 1.32. Transcutaneous pads placed. Per ED documentation, had received atropine from EMS prior to arrival. No reported syncope episodes.    Per outpatient documentation, prior EKGs had shown NSR with RBBB and 1st degree AVB. Holter monitor (2/2023) had shown "43 pauses >2 sec longest 2.2 sec; 2% isolated PVC." TTE (2/15/23) showed LVEF 72%, mild (grade I) diastolic dysfunction, and mild-mod AR. Lexiscan stress test (3/2023) was neg for ischemia. CT chest aorta (4/2023) showed tortuous 3.7cm aorta with pulm nodules and hilar adenopathy likely in setting of sarcoid and unchanged from prior CT. TSH was previously wnl (though borderline high-normal at 4.14 in 2/2023).    Admitted to Medicine for further management. 83M w/ hx of HTN, HLD, CKD3, CAD c/b MI (2003 cath at [place], reportedly 80% stenosis but no stent), mild LV diastolic dysfunction, ?pulmonary sarcoid, prostate cancer s/p radiation, ?TIA/amnesia episodes, chronic migraines, OA, incarcerated in [place] ([phone]), presenting with intermittent dizziness/lightheadedness x2 wks and bradycardia to HR 30s earlier in the day. Also endorsed fatigue. Not on BB at home. Noted he has had slow HRs in the past, initially found in Aug 2022 when he was being worked up for a lung biopsy for sarcoidosis (procedure canceled). Reported that he was recommended to get a PPM at the time, but he reportedly walked out. He has been following with cardiology since Feb 2023 (last seen in Aug 2023), underwent several tests and was told he did not need a PPM as he did not have any overt symptoms at the time. In the ED, patient was hypertensive to SBP 170s, HR 30s-40s. EKG showed 2:1 AVB. Labs with normal lactate, creatinine 1.32. Transcutaneous pads placed. Per ED documentation, had received atropine from EMS prior to arrival. No reported syncope episodes.    Per outpatient documentation, prior EKGs had shown NSR with RBBB and 1st degree AVB. Holter monitor (2/2023) had shown "43 pauses >2 sec longest 2.2 sec; 2% isolated PVC." TTE (2/15/23) showed LVEF 72%, mild (grade I) diastolic dysfunction, and mild-mod AR. Lexiscan stress test (3/2023) was neg for ischemia. CT chest aorta (4/2023) showed tortuous 3.7cm aorta with pulm nodules and hilar adenopathy likely in setting of sarcoid and unchanged from prior CT. TSH was previously wnl (though borderline high-normal at 4.14 in 2/2023).    Admitted to Medicine for further management.

## 2024-01-02 NOTE — H&P ADULT - PROBLEM SELECTOR PLAN 11
- DVT ppx: SCDs for now pending possible PPM placement  - Diet: NPO for now  - Dispo: pending possible PPM placement and further workup

## 2024-01-02 NOTE — ED ADULT TRIAGE NOTE - CHIEF COMPLAINT QUOTE
hr 40's Lightheaded Denies chest pain or sob Atropine 1mg given ivp hr 40's Lightheaded Denies chest pain or sob Atropine 1mg given ivp  was poss going to have pacemaker

## 2024-01-02 NOTE — H&P ADULT - NSICDXPASTMEDICALHX_GEN_ALL_CORE_FT
PAST MEDICAL HISTORY:  CAD (coronary artery disease)     Chronic constipation     History of myocardial infarction     History of TIA (transient ischemic attack)     HLD (hyperlipidemia)     HTN (hypertension)     Pulmonary sarcoidosis     Transient amnesia      PAST MEDICAL HISTORY:  CAD (coronary artery disease)     Chronic constipation     History of incarceration     History of myocardial infarction     History of TIA (transient ischemic attack)     HLD (hyperlipidemia)     HTN (hypertension)     Prostate cancer     Pulmonary sarcoidosis     Transient amnesia      PAST MEDICAL HISTORY:  CAD (coronary artery disease)     Chronic constipation     History of incarceration     History of myocardial infarction     History of TIA (transient ischemic attack)     HLD (hyperlipidemia)     HTN (hypertension)     Prostate cancer     Pulmonary sarcoidosis     Stage 3 chronic kidney disease     Transient amnesia

## 2024-01-02 NOTE — CONSULT NOTE ADULT - ASSESSMENT
83M with PMH of HTN, HLD, CAD, ?pulmonary sarcoid, presenting with 2 weeks of intermittent dizziness on exertion, noted to be in 2:1 AVB. 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 Lactate reassuringly normal. Regardless, given patient is symptomatic, PPM is indicated. Possibility of cardiac sarcoid is raised given pulmonary sarcoid, will start with TTE for further eval.     Recs:  -continue to monitor on tele  -NPO at [place], pre-op labs for possible PPM  -no AV lyn blockade  -hold home anti-HTNives for now  -obtain TTE, check TSH

## 2024-01-02 NOTE — H&P ADULT - PROBLEM SELECTOR PLAN 9
- c/w home PO dulcolax Hx of OA and L meniscus issue, for which pt reported taking OTC generic aleve/NSAID daily.  - advised pt to consider switching to tylenol arthritis dosing for pain control given concerns of side effects from daily NSAID use

## 2024-01-02 NOTE — H&P ADULT - PROBLEM SELECTOR PLAN 2
Hx of CAD c/b remote MI (2003 cath at [place], reportedly 80% stenosis but no stent)  - c/w home ASA  - unclear why not on statin at home  - hsTrop 30->30; EKG with no significant ST elevations/depressions; no active CP; low concern for ACS  - plan for 2:1 AV block as above Hx of CAD c/b remote MI (2003 cath at [place], reportedly 80% stenosis but no stent)  - c/w home ASA  - unclear why not on statin at home  - hsTrop 30->30; EKG with no significant ST elevations/depressions; no active CP; low concern for ACS  - plan for 2:1 AV block as above Hx of CAD c/b remote MI (2003 cath at [place], reportedly 80% stenosis but no stent)  - c/w home ASA  - unclear why not on statin at home  - hsTrop 30->30; EKG with no significant ST elevations/depressions; no active CP; low concern for ACS  - plan for 2:1 AV block as above

## 2024-01-02 NOTE — ED PROVIDER NOTE - CHILD ABUSE FACILITY
[facility]

## 2024-01-02 NOTE — H&P ADULT - PROBLEM SELECTOR PLAN 3
Hx of mild (grade 1) LV diastolic dysfunction and mild-mod AR on outpatient TTE (2/2023). ProBNP elevated to 551. Exam with b/l LE edema (but reportedly baseline per pt).  - f/u TTE as above  - previously on HCTZ, assess for need of any diuretics pending repeat TTE

## 2024-01-02 NOTE — ED ADULT NURSE NOTE - OBJECTIVE STATEMENT
84 y/o male with PMH sarcoidosis, previous MI, prostate       83-year-old male past medical history significant for sarcoidosis, previous MI, prostate cancer status postradiation, chronic migraines presents emergency department lightheadedness, shortness of breath, bradycardic.  Patient previously saw her cardiologist Alvarado carrillo in May due to lightheadedness, shortness of breath, episode of bradycardia, no pacemaker recommended at  this time. curently not SOB or lightheaded. received atropin w/ EMS. 82 y/o male with PMH sarcoidosis, previous MI, prostate       83-year-old male past medical history significant for sarcoidosis, previous MI, prostate cancer status postradiation, chronic migraines presents emergency department lightheadedness, shortness of breath, bradycardic.  Patient previously saw her cardiologist Alvarado carrillo in May due to lightheadedness, shortness of breath, episode of bradycardia, no pacemaker recommended at  this time. curently not SOB or lightheaded. received atropin w/ EMS. 84 y/o male with PMH sarcoidosis, previous MI, prostate cancer, presenting to ED for lightheadeness, SOB. pt found to be bradycardic in field by EMS to 36, 1mg atropine given by EMS which brought pt HR up to 48. On arrival pt still bradycardic to 36-40, some lightheadeness. Placed on pads, CM, jeannette 40s.  On exam pt A&Ox3  no difficulty speaking in complete sentences, s1s2 heart sounds heard, pulses x 4, trujillo x4, abdomen soft nontender nondistended, skin intact.   pt denies chest pain, sob, ha, n/v/d, abdominal pain, f/c, urinary symptoms, hematuria. 82 y/o male with PMH sarcoidosis, previous MI, prostate cancer, presenting to ED for lightheadeness, SOB. pt found to be bradycardic in field by EMS to 36, 1mg atropine given by EMS which brought pt HR up to 48. On arrival pt still bradycardic to 36-40, some lightheadeness. Placed on pads, CM, jeannette 40s.  On exam pt A&Ox3  no difficulty speaking in complete sentences, s1s2 heart sounds heard, pulses x 4, trujillo x4, abdomen soft nontender nondistended, skin intact.   pt denies chest pain, sob, ha, n/v/d, abdominal pain, f/c, urinary symptoms, hematuria.

## 2024-01-02 NOTE — H&P ADULT - PROBLEM SELECTOR PLAN 1
Presented with symptomatic bradycardia and initial EKG showed 2:1 AV block. Possibly in setting of ICM vs possible cardiac sarcoid vs endocrine etiology.  Prior EKGs showed NSR w/ RBBB and 1st degree AVB (not on BB);   Holter monitor (2/2023) had shown "43 pauses >2 sec longest 2.2 sec; 2% isolated PVC;"   TTE (2/15/23) showed LVEF 72%, mild (grade I) diastolic dysfunction, mild-mod AR  Lexiscan stress test (3/2023) was neg for ischemia  CT chest aorta (4/2023) showed tortuous 3.7cm aorta with pulm nodules and hilar adenopathy likely in setting of sarcoid and unchanged from prior CT.   TSH (2/2023) was previously wnl (though borderline high-normal at 4.14)    - Appreciate EP recs: PPM indicated; NPO at MN; noted to have improved conduction with bedside exercises (foot pedalling) which is suggestive of suprahisian block.  - c/w transcutaneous pads with zoll monitor and atropine at bedside  - avoid AV lyn blocking agents  - holding home antihypertensives at this time per EP/cardiology  - monitoring on telemetry  - f/u TSH/FT4 to assess for hypothyroidism  - f/u TTE to help assess for cardiac sarcoid involvement/signs of ischemia  - may need to consider further ischemic workup if TTE suggestive of underlying ischemic etiology (previously reported 80% stenosis with no stent placement)

## 2024-01-02 NOTE — H&P ADULT - TIME BILLING
reviewing prior documentation, reviewing available recent outpatient records on [facility], independently obtaining a history and interpreting results of tests, performing a physical examination, reviewing tests/imaging, discussing the plan with the patient, counseling and educating the patient, ordering medications/tests, documenting clinical information in the electronic health record, and coordinating care with staff. reviewing prior documentation, reviewing available recent outpatient records on [facility], independently obtaining a history and interpreting results of tests, performing a physical examination, reviewing tests/imaging, discussing the plan with the patient, counseling and educating the patient, ordering medications/tests, documenting clinical information in the electronic health record, and coordinating care with staff. reviewing prior documentation, reviewing available recent outpatient records on [facility], independently obtaining a history and interpreting results of tests, performing a physical examination, reviewing tests/imaging, discussing the plan with the patient, counseling and educating the patient, ordering medications/tests, documenting clinical information in the electronic health record, and coordinating care with staff.

## 2024-01-02 NOTE — H&P ADULT - PROBLEM SELECTOR PLAN 6
Pt reported hx of amnesia episodes x2 and possible TIA in the past (with transient slurring and unilateral drooling, since resolved without residual deficits).  - only on ASA 81mg daily (not on plavix at home)  - patient is interested in outpatient Neurology follow-up On admission, SCr 1.36 (baseline SCr 1.3 in Feb 2023). Likely at baseline CKD3.  - renally dose meds, avoid nephrotoxins  - monitor SCr and electrolytes

## 2024-01-02 NOTE — ED PROVIDER NOTE - OBJECTIVE STATEMENT
83-year-old male past medical history significant for sarcoidosis, previous MI, prostate cancer status postradiation, chronic migraines presents emergency department lightheadedness, shortness of breath, bradycardic.  Patient previously saw her cardiologist Alvarado carrillo in May due to lightheadedness, shortness of breath, episode of bradycardia, no pacemaker recommended at  this time. curently not SOB or lightheaded. received atropin w/ EMS.

## 2024-01-02 NOTE — H&P ADULT - PROBLEM SELECTOR PLAN 7
Hx of prostate cancer s/p radiation. On tamsulosin 0.4mg qHS at home.  - c/w home tamsulosin for now  - consider adjusting regimen given pt's reports of urinating 20+ times/day on once a day tamsulosin  - check UA in setting of urinary frequency Pt reported hx of amnesia episodes x2 and possible TIA in the past (with transient slurring and unilateral drooling, since resolved without residual deficits).  - only on ASA 81mg daily (not on plavix at home)  - patient is interested in outpatient Neurology follow-up

## 2024-01-02 NOTE — H&P ADULT - ASSESSMENT
83M w/ hx of HTN, HLD, CAD c/b MI (2003 cath at [place], reportedly 80% stenosis but no stent), mild LV diastolic dysfunction, ?pulmonary sarcoid, prostate cancer s/p radiation, ?TIA/amnesia episodes, chronic migraines, OA, incarcerated in [place] ([id number]), presenting with symptomatic bradycardia. 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 Pending possible PPM placement. intermittent dizziness/lightheadedness x2 wks and bradycardia to HR 30s earlier in the day. Also endorsed fatigue. Not on BB at home. Noted he has had slow HRs in the past, initially found in Aug 2022 when he was being worked up for a lung biopsy for sarcoidosis (procedure canceled). Reported that he was recommended to get a PPM at the time, but he reportedly walked out. He has been following with cardiology since Feb 2023 (last seen in Aug 2023), underwent several tests and was told he did not need a PPM as he did not have any overt symptoms at the time. In the ED, patient was hypertensive to SBP 170s, HR 30s-40s. EKG showed 2:1 AVB. Labs with normal lactate, creatinine 1.32. Transcutaneous pads placed. Per ED documentation, had received atropine from EMS prior to arrival.    Per outpatient documentation, prior EKGs had shown NSR with RBBB and 1st degree AVB. Holter monitor (2/2023) had shown "43 pauses >2 sec longest 2.2 sec; 2% isolated PVC." TTE (2/15/23) showed LVEF 72%, mild (grade I) diastolic dysfunction, and mild-mod AR. Lexiscan stress test (3/2023) was neg for ischemia. CT chest aorta (4/2023) showed tortuous 3.7cm aorta with pulm nodules and hilar adenopathy likely in setting of sarcoid and unchanged from prior CT. TSH was previously wnl (though borderline high-normal at 4.14 in 2/2023).    Admitted to Medicine for further management.   83M w/ hx of HTN, HLD, CAD c/b MI (2003 cath at [place], reportedly 80% stenosis but no stent), mild LV diastolic dysfunction, ?pulmonary sarcoid, prostate cancer s/p radiation, ?TIA/amnesia episodes, chronic migraines, OA, incarcerated in [place] ([id number]), presenting with symptomatic bradycardia. 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 Pending possible PPM placement. intermittent dizziness/lightheadedness x2 wks and bradycardia to HR 30s earlier in the day. Also endorsed fatigue. Not on BB at home. Noted he has had slow HRs in the past, initially found in Aug 2022 when he was being worked up for a lung biopsy for sarcoidosis (procedure canceled). Reported that he was recommended to get a PPM at the time, but he reportedly walked out. He has been following with cardiology since Feb 2023 (last seen in Aug 2023), underwent several tests and was told he did not need a PPM as he did not have any overt symptoms at the time. In the ED, patient was hypertensive to SBP 170s, HR 30s-40s. EKG showed 2:1 AVB. Labs with normal lactate, creatinine 1.32. Transcutaneous pads placed. Per ED documentation, had received atropine from EMS prior to arrival.    Per outpatient documentation, prior EKGs had shown NSR with RBBB and 1st degree AVB. Holter monitor (2/2023) had shown "43 pauses >2 sec longest 2.2 sec; 2% isolated PVC." TTE (2/15/23) showed LVEF 72%, mild (grade I) diastolic dysfunction, and mild-mod AR. Lexiscan stress test (3/2023) was neg for ischemia. CT chest aorta (4/2023) showed tortuous 3.7cm aorta with pulm nodules and hilar adenopathy likely in setting of sarcoid and unchanged from prior CT. TSH was previously wnl (though borderline high-normal at 4.14 in 2/2023).    Admitted to Medicine for further management.   83M w/ hx of HTN, HLD, CAD c/b MI (2003 cath at [place], reportedly 80% stenosis but no stent), mild LV diastolic dysfunction, ?pulmonary sarcoid, prostate cancer s/p radiation, ?TIA/amnesia episodes, chronic migraines, OA, incarcerated in [place] ([id number]), presenting with symptomatic bradycardia. 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 Pending possible PPM placement. intermittent dizziness/lightheadedness x2 wks and bradycardia to HR 30s earlier in the day. Also endorsed fatigue. Not on BB at home. Noted he has had slow HRs in the past, initially found in Aug 2022 when he was being worked up for a lung biopsy for sarcoidosis (procedure canceled). Reported that he was recommended to get a PPM at the time, but he reportedly walked out. He has been following with cardiology since Feb 2023 (last seen in Aug 2023), underwent several tests and was told he did not need a PPM as he did not have any overt symptoms at the time. In the ED, patient was hypertensive to SBP 170s, HR 30s-40s. EKG showed 2:1 AVB. Labs with normal lactate, creatinine 1.32. Transcutaneous pads placed. Per ED documentation, had received atropine from EMS prior to arrival.    Per outpatient documentation, prior EKGs had shown NSR with RBBB and 1st degree AVB. Holter monitor (2/2023) had shown "43 pauses >2 sec longest 2.2 sec; 2% isolated PVC." TTE (2/15/23) showed LVEF 72%, mild (grade I) diastolic dysfunction, and mild-mod AR. Lexiscan stress test (3/2023) was neg for ischemia. CT chest aorta (4/2023) showed tortuous 3.7cm aorta with pulm nodules and hilar adenopathy likely in setting of sarcoid and unchanged from prior CT. TSH was previously wnl (though borderline high-normal at 4.14 in 2/2023).    Admitted to Medicine for further management.   83M w/ hx of HTN, HLD, CKD3, CAD c/b MI (2003 cath at [place], reportedly 80% stenosis but no stent), mild LV diastolic dysfunction, ?pulmonary sarcoid, prostate cancer s/p radiation, ?TIA/amnesia episodes, chronic migraines, OA, incarcerated in [place] ([phone]), presenting with symptomatic bradycardia. 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 Pending possible PPM placement. 83M w/ hx of HTN, HLD, CKD3, CAD c/b MI (2003 cath at [place], reportedly 80% stenosis but no stent), mild LV diastolic dysfunction, ?pulmonary sarcoid, prostate cancer s/p radiation, ?TIA/amnesia episodes, chronic migraines, OA, incarcerated in [place] ([phone]), presenting with symptomatic bradycardia. 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 Pending possible PPM placement. 83M w/ hx of HTN, HLD, CKD3, CAD c/b MI (2003 cath at [place], reportedly 80% stenosis but no stent), mild LV diastolic dysfunction, ?pulmonary sarcoid, prostate cancer s/p radiation, ?TIA/amnesia episodes, chronic migraines, OA, incarcerated in [place] ([phone]), presenting with symptomatic bradycardia. 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 Pending possible PPM placement.

## 2024-01-02 NOTE — H&P ADULT - PROBLEM SELECTOR PLAN 4
Hx of HTN. At home, on losartan 100mg daily.  - holding home antihypertensives for now per Cardiology  - monitor BP

## 2024-01-02 NOTE — CONSULT NOTE ADULT - SUBJECTIVE AND OBJECTIVE BOX
Ravin Gallagher MD  Cardiology Fellow  [phone]  All Cardiology service information can be found 24/7 on [email or web address], password: [email or web address]    Patient seen and evaluated at bedside    HPI: 83M with PMH of HTN, HLD, CAD, ?pulmonary sarcoid, presenting with 2 weeks of intermittent dizziness on exertion. Checked his HR today and it was 30s, so came to the ED. Notes that he was told he had a low heart rate several months ago when he was being worked up for a lung biopsy, and has been following with cardiology since. Per charts, prior EKG at baseline is NSR with RBBB, 1st degree AVB (). Not on BBs.    In the ED, patient is hypertensive, HR 30s-40s. EKG showing 2:1 AVB. Labs with normal lactate, creatinine 1.32,     2/23 2/23 Echo LVEF 72% MAC; AR mild moderate.  3/23 lexiscan nuc neg/neg isch/inf EF 67%.   2/23 Holter 43 pauses >2 sec longest 2.2 sec pause 3:07 AM; 2% isolated PVC. TSH WNL Cr 1.36.   4/23 CT chest Aorta 3.7cm tortuous; pulmonary nodules and hilar adenopathy likely sarcoid unchanged from prior CT  Total visit time 45min.NSR 65 RBBB  off beta blocker. Meds stopped HCTZ 12.5, increase losartan 100, ASA 81   133/80 HR 77/min; 8/3/23 EKG NSR RBBB 72/min , Q 3, AVF stable.     Cardiac Meds: ASA, HCTZ 12.5, losartan 50, protonix 40    Allergies:  No Known Allergies    REVIEW OF SYSTEMS:  CONSTITUTIONAL: No weakness, fevers or chills  EYES/ENT: No visual changes;  No dysphagia  NECK: No pain or stiffness  RESPIRATORY: No cough, wheezing, hemoptysis; No shortness of breath  CARDIOVASCULAR: No chest pain or palpitations; No lower extremity edema  GASTROINTESTINAL: No abdominal or epigastric pain. No nausea, vomiting, or hematemesis; No diarrhea or constipation. No melena or hematochezia.  BACK: No back pain  GENITOURINARY: No dysuria, frequency or hematuria  NEUROLOGICAL: No numbness or weakness  SKIN: No itching, burning, rashes, or lesions   All other review of systems is negative unless indicated above.    Physical Exam:  T(F): 98 (01-02), Max: 98 (01-02)  HR: 45 (01-02) (41 - 45)  BP: 153/80 (01-02) (153/80 - 178/74)  RR: 20 (01-02)  SpO2: 96% (01-02)  GEN: NAD  HEENT: EOMI, clear sclera  PULM: CTA b/l, no wheeze  CV: RRR S1 S2, no murmur, no JVD  ABD: S, NT, ND  EXT: WWP, no edema  PSYCH: normal affect  SKIN: No rash    CXR: Personally reviewed    Labs: Personally reviewed                        13.2   9.11  )-----------( 258      ( 02 Jan 2024 20:18 )             41.6     01-02    141  |  107  |  21  ----------------------------<  91  4.2   |  26  |  1.32<H>    Ca    9.1      02 Jan 2024 20:18    TPro  6.0  /  Alb  3.9  /  TBili  0.2  /  DBili  x   /  AST  12  /  ALT  11  /  AlkPhos  70  01-02                       Ravin Gallagher MD  Cardiology Fellow  [phone]  All Cardiology service information can be found 24/7 on [email or web address], password: [email or web address]    Patient seen and evaluated at bedside    HPI: 83M with PMH of HTN, HLD, CAD, ?pulmonary sarcoid, presenting with 2 weeks of intermittent dizziness on exertion. Checked his HR today and it was 30s, so came to the ED. Notes that he was told he had a low heart rate several months ago when he was being worked up for a lung biopsy, and has been following with cardiology since. Per charts, prior EKG at baseline is NSR with RBBB, 1st degree AVB (). Not on BBs.    In the ED, patient is hypertensive, HR 30s-40s. EKG showing 2:1 AVB. Labs with normal lactate, creatinine 1.32,     2/23 2/23 Echo LVEF 72% MAC; AR mild moderate.  3/23 lexiscan nuc neg/neg isch/inf EF 67%.   2/23 Holter 43 pauses >2 sec longest 2.2 sec pause 3:07 AM; 2% isolated PVC. TSH WNL Cr 1.36.   4/23 CT chest Aorta 3.7cm tortuous; pulmonary nodules and hilar adenopathy likely sarcoid unchanged from prior CT  Total visit time 45min.NSR 65 RBBB  off beta blocker. Meds stopped HCTZ 12.5, increase losartan 100, ASA 81   133/80 HR 77/min; 8/3/23 EKG NSR RBBB 72/min , Q 3, AVF stable.     Cardiac Meds: ASA, HCTZ 12.5, losartan 50, protonix 40    Allergies:  No Known Allergies    REVIEW OF SYSTEMS:  CONSTITUTIONAL: No weakness, fevers or chills  EYES/ENT: No visual changes;  No dysphagia  NECK: No pain or stiffness  RESPIRATORY: No cough, wheezing, hemoptysis; No shortness of breath  CARDIOVASCULAR: No chest pain or palpitations; No lower extremity edema  GASTROINTESTINAL: No abdominal or epigastric pain. No nausea, vomiting, or hematemesis; No diarrhea or constipation. No melena or hematochezia.  BACK: No back pain  GENITOURINARY: No dysuria, frequency or hematuria  NEUROLOGICAL: No numbness or weakness  SKIN: No itching, burning, rashes, or lesions   All other review of systems is negative unless indicated above.    Physical Exam:  T(F): 98 (01-02), Max: 98 (01-02)  HR: 45 (01-02) (41 - 45)  BP: 153/80 (01-02) (153/80 - 178/74)  RR: 20 (01-02)  SpO2: 96% (01-02)  GEN: NAD  HEENT: EOMI, clear sclera  PULM: CTA b/l, no wheeze  CV: RRR S1 S2, no murmur, no JVD  ABD: S, NT, ND  EXT: WWP, no edema  PSYCH: normal affect  SKIN: No rash    CXR: Personally reviewed    Labs: Personally reviewed                        13.2   9.11  )-----------( 258      ( 02 Jan 2024 20:18 )             41.6     01-02    141  |  107  |  21  ----------------------------<  91  4.2   |  26  |  1.32<H>    Ca    9.1      02 Jan 2024 20:18    TPro  6.0  /  Alb  3.9  /  TBili  0.2  /  DBili  x   /  AST  12  /  ALT  11  /  AlkPhos  70  01-02                       Ravin Gallagher MD  Cardiology Fellow  [phone]  All Cardiology service information can be found 24/7 on [email or web address], password: [email or web address]    Patient seen and evaluated at bedside    HPI: 83M with PMH of HTN, HLD, CAD, ?pulmonary sarcoid, presenting with 2 weeks of intermittent dizziness on exertion. Checked his HR today and it was 30s, so came to the ED. Notes that he was told he had a low heart rate several months ago when he was being worked up for a lung biopsy, and has been following with cardiology since. Per charts, prior EKG at baseline is NSR with RBBB, 1st degree AVB (). Not on BBs.    In the ED, patient is hypertensive, HR 30s-40s. EKG showing 2:1 AVB. Labs with normal lactate, creatinine 1.32,     2/23 2/23 Echo LVEF 72% MAC; AR mild moderate.  3/23 lexiscan nuc neg/neg isch/inf EF 67%.   2/23 Holter 43 pauses >2 sec longest 2.2 sec pause 3:07 AM; 2% isolated PVC. TSH WNL Cr 1.36.   4/23 CT chest Aorta 3.7cm tortuous; pulmonary nodules and hilar adenopathy likely sarcoid unchanged from prior CT  Total visit time 45min.NSR 65 RBBB  off beta blocker. Meds stopped HCTZ 12.5, increase losartan 100, ASA 81   133/80 HR 77/min; 8/3/23 EKG NSR RBBB 72/min , Q 3, AVF stable.     Cardiac Meds: ASA, HCTZ 12.5, losartan 50, protonix 40    Allergies:  No Known Allergies    REVIEW OF SYSTEMS:  CONSTITUTIONAL: No weakness, fevers or chills  EYES/ENT: No visual changes;  No dysphagia  NECK: No pain or stiffness  RESPIRATORY: No cough, wheezing, hemoptysis; No shortness of breath  CARDIOVASCULAR: No chest pain or palpitations; No lower extremity edema  GASTROINTESTINAL: No abdominal or epigastric pain. No nausea, vomiting, or hematemesis; No diarrhea or constipation. No melena or hematochezia.  BACK: No back pain  GENITOURINARY: No dysuria, frequency or hematuria  NEUROLOGICAL: No numbness or weakness  SKIN: No itching, burning, rashes, or lesions   All other review of systems is negative unless indicated above.    Physical Exam:  T(F): 98 (01-02), Max: 98 (01-02)  HR: 45 (01-02) (41 - 45)  BP: 153/80 (01-02) (153/80 - 178/74)  RR: 20 (01-02)  SpO2: 96% (01-02)  GEN: NAD  HEENT: EOMI, clear sclera  PULM: CTA b/l, no wheeze  CV: RRR S1 S2, no murmur, no JVD  ABD: S, NT, ND  EXT: WWP, no edema  PSYCH: normal affect  SKIN: No rash    CXR: Personally reviewed    Labs: Personally reviewed                        13.2   9.11  )-----------( 258      ( 02 Jan 2024 20:18 )             41.6     01-02    141  |  107  |  21  ----------------------------<  91  4.2   |  26  |  1.32<H>    Ca    9.1      02 Jan 2024 20:18    TPro  6.0  /  Alb  3.9  /  TBili  0.2  /  DBili  x   /  AST  12  /  ALT  11  /  AlkPhos  70  01-02

## 2024-01-02 NOTE — H&P ADULT - PROBLEM SELECTOR PLAN 5
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 No hx of tissue biopsy ([facility] was [facility]orted in setting of bradycardia). Previous niox and spirometry testing was within normal.  - continue outpatient Pulm follow-up  - would consider Rheum eval if found to have cardiac component

## 2024-01-02 NOTE — ED PROVIDER NOTE - ATTENDING CONTRIBUTION TO CARE
I was the supervising attending. I have independently seen face-to-face and examined the patient. I have reviewed the history and physical and discussed the MDM with the resident, fellow, SOFIA and/or student. I agree with the assessment and plan as presented unless otherwise documented as follows:    83M hx sarcoidosis, CAD/MI, prostate CA, HTN, presenting with lightheadedness/palpitations and low HR. Reports admission at outside hospital in 5/2023 and was told at the time he needed a PPM 2/2 low HR to 30s but AMA'ed. Followed with a cardiologist since then and was told to continue monitoring symptoms but did not require PPM. Currently endorses worsening episodes of lightheadedness, fatigue, and palpitations over last few weeks. Was found by EMS today to be bradycardic to 30s and s/p atropine x1. Denies CP, LOC, fevers/chills, BB use. Medication list reviewed with patient and wife at bedside. Appears well, AOx3, not in acute distress, HR to 30-40s here, mildly HTNsive BP. ECG with 2:1 AV block. Cardiology consulted, to see patient in the ED. Placed on transcutaneous pads. Will obtain labs/CXR. Likely symptomatic bradycardia/AV block, anticipate will require admission for PPM. -Selene Manzano MD (Attending)

## 2024-01-02 NOTE — H&P ADULT - NSHPSOCIALHISTORY_GEN_ALL_CORE
Never smoker. One EtOH drink every 1-2 wks. Denied illicit/recreational drug use. Ambulates without assistive devices at baseline.

## 2024-01-03 ENCOUNTER — TRANSCRIPTION ENCOUNTER (OUTPATIENT)
Age: 84
End: 2024-01-03

## 2024-01-03 DIAGNOSIS — I44.1 ATRIOVENTRICULAR BLOCK, SECOND DEGREE: ICD-10-CM

## 2024-01-03 DIAGNOSIS — Z29.9 ENCOUNTER FOR PROPHYLACTIC MEASURES, UNSPECIFIED: ICD-10-CM

## 2024-01-03 DIAGNOSIS — Z86.73 PERSONAL HISTORY OF TRANSIENT ISCHEMIC ATTACK (TIA), AND CEREBRAL INFARCTION WITHOUT RESIDUAL DEFICITS: ICD-10-CM

## 2024-01-03 DIAGNOSIS — N18.30 CHRONIC KIDNEY DISEASE, STAGE 3 UNSPECIFIED: ICD-10-CM

## 2024-01-03 DIAGNOSIS — K59.09 OTHER CONSTIPATION: ICD-10-CM

## 2024-01-03 DIAGNOSIS — I51.9 HEART DISEASE, UNSPECIFIED: ICD-10-CM

## 2024-01-03 DIAGNOSIS — Z85.46 PERSONAL HISTORY OF MALIGNANT NEOPLASM OF PROSTATE: ICD-10-CM

## 2024-01-03 DIAGNOSIS — M19.90 UNSPECIFIED OSTEOARTHRITIS, UNSPECIFIED SITE: ICD-10-CM

## 2024-01-03 DIAGNOSIS — I25.10 ATHEROSCLEROTIC HEART DISEASE OF NATIVE CORONARY ARTERY WITHOUT ANGINA PECTORIS: ICD-10-CM

## 2024-01-03 DIAGNOSIS — Z98.890 OTHER SPECIFIED POSTPROCEDURAL STATES: Chronic | ICD-10-CM

## 2024-01-03 DIAGNOSIS — D86.0 SARCOIDOSIS OF LUNG: ICD-10-CM

## 2024-01-03 DIAGNOSIS — I10 ESSENTIAL (PRIMARY) HYPERTENSION: ICD-10-CM

## 2024-01-03 LAB
ALBUMIN SERPL ELPH-MCNC: 3.5 G/DL — SIGNIFICANT CHANGE UP (ref 3.3–5)
ALP SERPL-CCNC: 71 U/L — SIGNIFICANT CHANGE UP (ref 40–120)
ALT FLD-CCNC: 8 U/L — LOW (ref 10–45)
ANION GAP SERPL CALC-SCNC: 8 MMOL/L — SIGNIFICANT CHANGE UP (ref 5–17)
APPEARANCE UR: CLEAR — SIGNIFICANT CHANGE UP
AST SERPL-CCNC: 13 U/L — SIGNIFICANT CHANGE UP (ref 10–40)
BACTERIA # UR AUTO: NEGATIVE /HPF — SIGNIFICANT CHANGE UP
BILIRUB SERPL-MCNC: 0.4 MG/DL — SIGNIFICANT CHANGE UP (ref 0.2–1.2)
BILIRUB UR-MCNC: NEGATIVE — SIGNIFICANT CHANGE UP
BUN SERPL-MCNC: 21 MG/DL — SIGNIFICANT CHANGE UP (ref 7–23)
CALCIUM SERPL-MCNC: 9.4 MG/DL — SIGNIFICANT CHANGE UP (ref 8.4–10.5)
CAST: 1 /LPF — SIGNIFICANT CHANGE UP (ref 0–4)
CHLORIDE SERPL-SCNC: 110 MMOL/L — HIGH (ref 96–108)
CO2 SERPL-SCNC: 24 MMOL/L — SIGNIFICANT CHANGE UP (ref 22–31)
COLOR SPEC: YELLOW — SIGNIFICANT CHANGE UP
CREAT SERPL-MCNC: 1.32 MG/DL — HIGH (ref 0.5–1.3)
DIFF PNL FLD: NEGATIVE — SIGNIFICANT CHANGE UP
EGFR: 53 ML/MIN/1.73M2 — LOW
GLUCOSE SERPL-MCNC: 86 MG/DL — SIGNIFICANT CHANGE UP (ref 70–99)
GLUCOSE UR QL: NEGATIVE MG/DL — SIGNIFICANT CHANGE UP
HCT VFR BLD CALC: 39.5 % — SIGNIFICANT CHANGE UP (ref 39–50)
HGB BLD-MCNC: 12.8 G/DL — LOW (ref 13–17)
KETONES UR-MCNC: NEGATIVE MG/DL — SIGNIFICANT CHANGE UP
LEUKOCYTE ESTERASE UR-ACNC: NEGATIVE — SIGNIFICANT CHANGE UP
MAGNESIUM SERPL-MCNC: 2.1 MG/DL — SIGNIFICANT CHANGE UP (ref 1.6–2.6)
MCHC RBC-ENTMCNC: 27.5 PG — SIGNIFICANT CHANGE UP (ref 27–34)
MCHC RBC-ENTMCNC: 32.4 GM/DL — SIGNIFICANT CHANGE UP (ref 32–36)
MCV RBC AUTO: 84.8 FL — SIGNIFICANT CHANGE UP (ref 80–100)
NITRITE UR-MCNC: NEGATIVE — SIGNIFICANT CHANGE UP
NRBC # BLD: 0 /100 WBCS — SIGNIFICANT CHANGE UP (ref 0–0)
PH UR: 5 — SIGNIFICANT CHANGE UP (ref 5–8)
PHOSPHATE SERPL-MCNC: 3.2 MG/DL — SIGNIFICANT CHANGE UP (ref 2.5–4.5)
PLATELET # BLD AUTO: 239 K/UL — SIGNIFICANT CHANGE UP (ref 150–400)
POTASSIUM SERPL-MCNC: 4.2 MMOL/L — SIGNIFICANT CHANGE UP (ref 3.5–5.3)
POTASSIUM SERPL-SCNC: 4.2 MMOL/L — SIGNIFICANT CHANGE UP (ref 3.5–5.3)
PROT SERPL-MCNC: 5.8 G/DL — LOW (ref 6–8.3)
PROT UR-MCNC: NEGATIVE MG/DL — SIGNIFICANT CHANGE UP
RBC # BLD: 4.66 M/UL — SIGNIFICANT CHANGE UP (ref 4.2–5.8)
RBC # FLD: 14.1 % — SIGNIFICANT CHANGE UP (ref 10.3–14.5)
RBC CASTS # UR COMP ASSIST: 0 /HPF — SIGNIFICANT CHANGE UP (ref 0–4)
SODIUM SERPL-SCNC: 142 MMOL/L — SIGNIFICANT CHANGE UP (ref 135–145)
SP GR SPEC: 1.01 — SIGNIFICANT CHANGE UP (ref 1–1.03)
SQUAMOUS # UR AUTO: 0 /HPF — SIGNIFICANT CHANGE UP (ref 0–5)
T3 SERPL-MCNC: 121 NG/DL — SIGNIFICANT CHANGE UP (ref 80–200)
T4 AB SER-ACNC: 8.6 UG/DL — SIGNIFICANT CHANGE UP (ref 4.6–12)
T4 FREE SERPL-MCNC: 1.3 NG/DL — SIGNIFICANT CHANGE UP (ref 0.9–1.8)
TSH SERPL-MCNC: 5.73 UIU/ML — HIGH (ref 0.27–4.2)
UROBILINOGEN FLD QL: 0.2 MG/DL — SIGNIFICANT CHANGE UP (ref 0.2–1)
WBC # BLD: 9.45 K/UL — SIGNIFICANT CHANGE UP (ref 3.8–10.5)
WBC # FLD AUTO: 9.45 K/UL — SIGNIFICANT CHANGE UP (ref 3.8–10.5)
WBC UR QL: 1 /HPF — SIGNIFICANT CHANGE UP (ref 0–5)

## 2024-01-03 PROCEDURE — 99223 1ST HOSP IP/OBS HIGH 75: CPT

## 2024-01-03 PROCEDURE — 33208 INSRT HEART PM ATRIAL & VENT: CPT | Mod: KX

## 2024-01-03 PROCEDURE — 93306 TTE W/DOPPLER COMPLETE: CPT | Mod: 26

## 2024-01-03 RX ORDER — ACETAMINOPHEN 500 MG
975 TABLET ORAL EVERY 6 HOURS
Refills: 0 | Status: DISCONTINUED | OUTPATIENT
Start: 2024-01-03 | End: 2024-01-05

## 2024-01-03 RX ORDER — HALOPERIDOL DECANOATE 100 MG/ML
2.5 INJECTION INTRAMUSCULAR ONCE
Refills: 0 | Status: COMPLETED | OUTPATIENT
Start: 2024-01-03 | End: 2024-01-03

## 2024-01-03 RX ORDER — ACETAMINOPHEN 500 MG
650 TABLET ORAL EVERY 6 HOURS
Refills: 0 | Status: DISCONTINUED | OUTPATIENT
Start: 2024-01-03 | End: 2024-01-05

## 2024-01-03 RX ORDER — TAMSULOSIN HYDROCHLORIDE 0.4 MG/1
0.4 CAPSULE ORAL AT BEDTIME
Refills: 0 | Status: DISCONTINUED | OUTPATIENT
Start: 2024-01-03 | End: 2024-01-05

## 2024-01-03 RX ORDER — PANTOPRAZOLE SODIUM 20 MG/1
40 TABLET, DELAYED RELEASE ORAL
Refills: 0 | Status: DISCONTINUED | OUTPATIENT
Start: 2024-01-03 | End: 2024-01-05

## 2024-01-03 RX ORDER — ACETAMINOPHEN 500 MG
975 TABLET ORAL EVERY 8 HOURS
Refills: 0 | Status: DISCONTINUED | OUTPATIENT
Start: 2024-01-03 | End: 2024-01-03

## 2024-01-03 RX ORDER — CEFAZOLIN SODIUM 1 G
2000 VIAL (EA) INJECTION EVERY 8 HOURS
Refills: 0 | Status: COMPLETED | OUTPATIENT
Start: 2024-01-04 | End: 2024-01-04

## 2024-01-03 RX ORDER — IPRATROPIUM/ALBUTEROL SULFATE 18-103MCG
3 AEROSOL WITH ADAPTER (GRAM) INHALATION ONCE
Refills: 0 | Status: DISCONTINUED | OUTPATIENT
Start: 2024-01-03 | End: 2024-01-05

## 2024-01-03 RX ADMIN — Medication 975 MILLIGRAM(S): at 20:45

## 2024-01-03 RX ADMIN — HALOPERIDOL DECANOATE 2.5 MILLIGRAM(S): 100 INJECTION INTRAMUSCULAR at 18:40

## 2024-01-03 RX ADMIN — Medication 975 MILLIGRAM(S): at 21:30

## 2024-01-03 RX ADMIN — TAMSULOSIN HYDROCHLORIDE 0.4 MILLIGRAM(S): 0.4 CAPSULE ORAL at 20:45

## 2024-01-03 NOTE — PROGRESS NOTE ADULT - SUBJECTIVE AND OBJECTIVE BOX
Cardiology Progress Note  ------------------------------------------------------------------------------------------    SUBJECTIVE/EVENTS:  - Tele:   -     -------------------------------------------------------------------------------------------  ROS:  CV: chest pain (-), palpitation (-), orthopnea (-), PND (-), edema (-)  PULM: SOB (-), cough (-), wheezing (-), hemoptysis (-).   CONST: fever (-), chills (-) or fatigue (-)  GI: abdominal distension (-), abdominal pain (-) , nausea/vomiting (-), hematemesis, (-), melena (-), hematochezia (-)  : dysuria (-), frequency (-), hematuria (-).   NEURO: numbness (-), weakness (-), dizziness (-)  MSK: myalgia (-), joint pain (-)   SKIN: itching (-), rash (-)  HEENT:  visual changes (-); vertigo or throat pain (-);  neck stiffness (-)   Psych: change in mood (-), anxiety (-), depression (-)     All other review of systems is negative unless indicated above.   -------------------------------------------------------------------------------------------  VITALS:  T(F): 97.7 (01-03), Max: 98.4 (01-03)  HR: 41 (01-03) (41 - 55)  BP: 149/68 (01-03) (132/72 - 178/74)  RR: 18 (01-03)  SpO2: 96% (01-03)  I&O's Summary    ------------------------------------------------------------------------------------------  PHYSICAL EXAM:  GEN: NAD, sitting in bed  HEENT: NCAT, EOMI  CV: bradycardic, Ns1/s2, no m/r/g, JVP not elevated  RESP: CTA B/l, no w/r/r  ABD: soft, nt, nd  EXT: warm, 2+ pulses, no edema  SKIN: no visible lesions, rashes  NEURO: AAOx3, no focal deficits  PSYCH: Calm, cooperative  -------------------------------------------------------------------------------------------  LABS:                          12.8   9.45  )-----------( 239      ( 03 Jan 2024 06:45 )             39.5     01-02    141  |  107  |  21  ----------------------------<  91  4.2   |  26  |  1.32<H>    Ca    9.1      02 Jan 2024 20:18    TPro  6.0  /  Alb  3.9  /  TBili  0.2  /  DBili  x   /  AST  12  /  ALT  11  /  AlkPhos  70  01-02    PT/INR - ( 02 Jan 2024 21:34 )   PT: 11.4 sec;   INR: 1.09 ratio         PTT - ( 02 Jan 2024 21:34 )  PTT:32.3 sec  CARDIAC MARKERS ( 02 Jan 2024 22:47 )  30 ng/L / x     / x     / x     / x     / x      CARDIAC MARKERS ( 02 Jan 2024 20:18 )  30 ng/L / x     / x     / x     / x     / x                -------------------------------------------------------------------------------------------  Meds:  acetaminophen     Tablet .. 975 milliGRAM(s) Oral every 8 hours PRN  bisacodyl 5 milliGRAM(s) Oral at bedtime  pantoprazole    Tablet 40 milliGRAM(s) Oral before breakfast  tamsulosin 0.4 milliGRAM(s) Oral at bedtime    -------------------------------------------------------------------------------------------  Cardiovascular Diagnostic Testing:      -------------------------------------------------------------------------------------------  Assessment and Plan:   83M with PMH of HTN, HLD, CAD, ?pulmonary sarcoid, presenting with 2 weeks of intermittent dizziness on exertion, noted to be in 2:1 AVB. 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 Lactate reassuringly normal. Regardless, given patient is symptomatic, PPM is indicated. Possibility of cardiac sarcoid is raised given pulmonary sarcoid, will start with TTE for further eval.     #2:1 AV block    Recommendations:  -  -    Note is incomplete    *** Recommendations are preliminary until cosigned by the attending.    Cayden Aranda MD  Cardiology Fellow    For all New Consults and Questions:  [email or web address]   Login: [email or web address] Cardiology Progress Note  ------------------------------------------------------------------------------------------    SUBJECTIVE/EVENTS:  - Tele:   -     -------------------------------------------------------------------------------------------  ROS:  CV: chest pain (-), palpitation (-), orthopnea (-), PND (-), edema (-)  PULM: SOB (-), cough (-), wheezing (-), hemoptysis (-).   CONST: fever (-), chills (-) or fatigue (-)  GI: abdominal distension (-), abdominal pain (-) , nausea/vomiting (-), hematemesis, (-), melena (-), hematochezia (-)  : dysuria (-), frequency (-), hematuria (-).   NEURO: numbness (-), weakness (-), dizziness (-)  MSK: myalgia (-), joint pain (-)   SKIN: itching (-), rash (-)  HEENT:  visual changes (-); vertigo or throat pain (-);  neck stiffness (-)   Psych: change in mood (-), anxiety (-), depression (-)     All other review of systems is negative unless indicated above.   -------------------------------------------------------------------------------------------  VITALS:  T(F): 97.7 (01-03), Max: 98.4 (01-03)  HR: 41 (01-03) (41 - 55)  BP: 149/68 (01-03) (132/72 - 178/74)  RR: 18 (01-03)  SpO2: 96% (01-03)  I&O's Summary    ------------------------------------------------------------------------------------------  PHYSICAL EXAM:  GEN: NAD, sitting in bed  HEENT: NCAT, EOMI  CV: bradycardic, Ns1/s2, no m/r/g, JVP not elevated  RESP: CTA B/l, no w/r/r  ABD: soft, nt, nd  EXT: warm, 2+ pulses, no edema  SKIN: no visible lesions, rashes  NEURO: AAOx3, no focal deficits  PSYCH: Calm, cooperative  -------------------------------------------------------------------------------------------  LABS:                          12.8   9.45  )-----------( 239      ( 03 Jan 2024 06:45 )             39.5     01-02    141  |  107  |  21  ----------------------------<  91  4.2   |  26  |  1.32<H>    Ca    9.1      02 Jan 2024 20:18    TPro  6.0  /  Alb  3.9  /  TBili  0.2  /  DBili  x   /  AST  12  /  ALT  11  /  AlkPhos  70  01-02    PT/INR - ( 02 Jan 2024 21:34 )   PT: 11.4 sec;   INR: 1.09 ratio         PTT - ( 02 Jan 2024 21:34 )  PTT:32.3 sec  CARDIAC MARKERS ( 02 Jan 2024 22:47 )  30 ng/L / x     / x     / x     / x     / x      CARDIAC MARKERS ( 02 Jan 2024 20:18 )  30 ng/L / x     / x     / x     / x     / x                -------------------------------------------------------------------------------------------  Meds:  acetaminophen     Tablet .. 975 milliGRAM(s) Oral every 8 hours PRN  bisacodyl 5 milliGRAM(s) Oral at bedtime  pantoprazole    Tablet 40 milliGRAM(s) Oral before breakfast  tamsulosin 0.4 milliGRAM(s) Oral at bedtime    -------------------------------------------------------------------------------------------  Cardiovascular Diagnostic Testing:      -------------------------------------------------------------------------------------------  Assessment and Plan:   83M with PMH of HTN, HLD, CAD, ?pulmonary sarcoid, presenting with 2 weeks of intermittent dizziness on exertion, noted to be in 2:1 AVB. 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 Lactate reassuringly normal. Regardless, given patient is symptomatic, PPM is indicated. Possibility of cardiac sarcoid is raised given pulmonary sarcoid, will start with TTE for further eval.     #2:1 AV block    Recommendations:  -  -    Note is incomplete    *** Recommendations are preliminary until cosigned by the attending.    Cayden Aranda MD  Cardiology Fellow    For all New Consults and Questions:  [email or web address]   Login: [email or web address] Cardiology Progress Note  ------------------------------------------------------------------------------------------    SUBJECTIVE/EVENTS:  - Tele:   -     -------------------------------------------------------------------------------------------  ROS:  CV: chest pain (-), palpitation (-), orthopnea (-), PND (-), edema (-)  PULM: SOB (-), cough (-), wheezing (-), hemoptysis (-).   CONST: fever (-), chills (-) or fatigue (-)  GI: abdominal distension (-), abdominal pain (-) , nausea/vomiting (-), hematemesis, (-), melena (-), hematochezia (-)  : dysuria (-), frequency (-), hematuria (-).   NEURO: numbness (-), weakness (-), dizziness (-)  MSK: myalgia (-), joint pain (-)   SKIN: itching (-), rash (-)  HEENT:  visual changes (-); vertigo or throat pain (-);  neck stiffness (-)   Psych: change in mood (-), anxiety (-), depression (-)     All other review of systems is negative unless indicated above.   -------------------------------------------------------------------------------------------  VITALS:  T(F): 97.7 (01-03), Max: 98.4 (01-03)  HR: 41 (01-03) (41 - 55)  BP: 149/68 (01-03) (132/72 - 178/74)  RR: 18 (01-03)  SpO2: 96% (01-03)  I&O's Summary    ------------------------------------------------------------------------------------------  PHYSICAL EXAM:  GEN: NAD, sitting in bed  HEENT: NCAT, EOMI  CV: bradycardic, Ns1/s2, no m/r/g, JVP not elevated  RESP: CTA B/l, no w/r/r  ABD: soft, nt, nd  EXT: warm, 2+ pulses, no edema  SKIN: no visible lesions, rashes  NEURO: AAOx3, no focal deficits  PSYCH: Calm, cooperative  -------------------------------------------------------------------------------------------  LABS:                          12.8   9.45  )-----------( 239      ( 03 Jan 2024 06:45 )             39.5     01-02    141  |  107  |  21  ----------------------------<  91  4.2   |  26  |  1.32<H>    Ca    9.1      02 Jan 2024 20:18    TPro  6.0  /  Alb  3.9  /  TBili  0.2  /  DBili  x   /  AST  12  /  ALT  11  /  AlkPhos  70  01-02    PT/INR - ( 02 Jan 2024 21:34 )   PT: 11.4 sec;   INR: 1.09 ratio         PTT - ( 02 Jan 2024 21:34 )  PTT:32.3 sec  CARDIAC MARKERS ( 02 Jan 2024 22:47 )  30 ng/L / x     / x     / x     / x     / x      CARDIAC MARKERS ( 02 Jan 2024 20:18 )  30 ng/L / x     / x     / x     / x     / x                -------------------------------------------------------------------------------------------  Meds:  acetaminophen     Tablet .. 975 milliGRAM(s) Oral every 8 hours PRN  bisacodyl 5 milliGRAM(s) Oral at bedtime  pantoprazole    Tablet 40 milliGRAM(s) Oral before breakfast  tamsulosin 0.4 milliGRAM(s) Oral at bedtime    -------------------------------------------------------------------------------------------  Cardiovascular Diagnostic Testing:      -------------------------------------------------------------------------------------------  Assessment and Plan:   83M with PMH of HTN, HLD, CAD, ?pulmonary sarcoid, presenting with 2 weeks of intermittent dizziness on exertion, noted to be in 2:1 AVB. 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 Lactate reassuringly normal. Regardless, given patient is symptomatic, PPM is indicated. Possibility of cardiac sarcoid is raised given pulmonary sarcoid, will start with TTE for further eval.     #2:1 AV block    Recommendations:  -  -    Note is incomplete    *** Recommendations are preliminary until cosigned by the attending.    Cayden Aranda MD  Cardiology Fellow    For all New Consults and Questions:  [email or web address]   Login: [email or web address] Cardiology Progress Note  ------------------------------------------------------------------------------------------    SUBJECTIVE/EVENTS:  - Tele: 2:1 AV block w/ intermittent NSR  - NAEO    -------------------------------------------------------------------------------------------  ROS:  CV: chest pain (-), palpitation (-), orthopnea (-), PND (-), edema (-)  PULM: SOB (-), cough (-), wheezing (-), hemoptysis (-).   CONST: fever (-), chills (-) or fatigue (-)  GI: abdominal distension (-), abdominal pain (-) , nausea/vomiting (-), hematemesis, (-), melena (-), hematochezia (-)  : dysuria (-), frequency (-), hematuria (-).   NEURO: numbness (-), weakness (-), dizziness (-)  MSK: myalgia (-), joint pain (-)   SKIN: itching (-), rash (-)  HEENT:  visual changes (-); vertigo or throat pain (-);  neck stiffness (-)   Psych: change in mood (-), anxiety (-), depression (-)     All other review of systems is negative unless indicated above.   -------------------------------------------------------------------------------------------  VITALS:  T(F): 97.7 (01-03), Max: 98.4 (01-03)  HR: 41 (01-03) (41 - 55)  BP: 149/68 (01-03) (132/72 - 178/74)  RR: 18 (01-03)  SpO2: 96% (01-03)  I&O's Summary    ------------------------------------------------------------------------------------------  PHYSICAL EXAM:  GEN: NAD, sitting in bed  HEENT: NCAT, EOMI  CV: bradycardic, Ns1/s2, no m/r/g, JVP not elevated  RESP: CTA B/l, no w/r/r  ABD: soft, nt, nd  EXT: warm, 2+ pulses, no edema  SKIN: no visible lesions, rashes  NEURO: AAOx3, no focal deficits  PSYCH: Calm, cooperative  -------------------------------------------------------------------------------------------  LABS:                          12.8   9.45  )-----------( 239      ( 03 Jan 2024 06:45 )             39.5     01-02    141  |  107  |  21  ----------------------------<  91  4.2   |  26  |  1.32<H>    Ca    9.1      02 Jan 2024 20:18    TPro  6.0  /  Alb  3.9  /  TBili  0.2  /  DBili  x   /  AST  12  /  ALT  11  /  AlkPhos  70  01-02    PT/INR - ( 02 Jan 2024 21:34 )   PT: 11.4 sec;   INR: 1.09 ratio         PTT - ( 02 Jan 2024 21:34 )  PTT:32.3 sec  CARDIAC MARKERS ( 02 Jan 2024 22:47 )  30 ng/L / x     / x     / x     / x     / x      CARDIAC MARKERS ( 02 Jan 2024 20:18 )  30 ng/L / x     / x     / x     / x     / x          -------------------------------------------------------------------------------------------  Meds:  acetaminophen     Tablet .. 975 milliGRAM(s) Oral every 8 hours PRN  bisacodyl 5 milliGRAM(s) Oral at bedtime  pantoprazole    Tablet 40 milliGRAM(s) Oral before breakfast  tamsulosin 0.4 milliGRAM(s) Oral at bedtime    -------------------------------------------------------------------------------------------  Cardiovascular Diagnostic Testing:      -------------------------------------------------------------------------------------------  Assessment and Plan:   83M with PMH of HTN, HLD, CAD, ?pulmonary sarcoid, presenting with 2 weeks of intermittent dizziness on exertion, noted to be in 2:1 AVB for which EP is consulted.     #2:1 AV block  Pt p/w dizziness found to be in 2:1 AV block. 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 Lactate reassuringly normal. Likely that block is 2/2 to age related conduction disease, however unclear if pt has sarcoid given underlying pulm sarcoid. Regardless, given patient is symptomatic, PPM is indicated.    Recommendations:  - f/up TTE  - keep NPO for possible PPM today vs tomorrow, hold DVT ppx  - please check TSH  - avoid AV lyn blocking agents  - K >4, Mg >2  - monitor on tele    *** Recommendations are preliminary until cosigned by the attending.    Cayden Aranda MD  Cardiology Fellow    For all New Consults and Questions:  [email or web address]   Login: [email or web address] Cardiology Progress Note  ------------------------------------------------------------------------------------------    SUBJECTIVE/EVENTS:  - Tele: 2:1 AV block w/ intermittent NSR  - NAEO    -------------------------------------------------------------------------------------------  ROS:  CV: chest pain (-), palpitation (-), orthopnea (-), PND (-), edema (-)  PULM: SOB (-), cough (-), wheezing (-), hemoptysis (-).   CONST: fever (-), chills (-) or fatigue (-)  GI: abdominal distension (-), abdominal pain (-) , nausea/vomiting (-), hematemesis, (-), melena (-), hematochezia (-)  : dysuria (-), frequency (-), hematuria (-).   NEURO: numbness (-), weakness (-), dizziness (-)  MSK: myalgia (-), joint pain (-)   SKIN: itching (-), rash (-)  HEENT:  visual changes (-); vertigo or throat pain (-);  neck stiffness (-)   Psych: change in mood (-), anxiety (-), depression (-)     All other review of systems is negative unless indicated above.   -------------------------------------------------------------------------------------------  VITALS:  T(F): 97.7 (01-03), Max: 98.4 (01-03)  HR: 41 (01-03) (41 - 55)  BP: 149/68 (01-03) (132/72 - 178/74)  RR: 18 (01-03)  SpO2: 96% (01-03)  I&O's Summary    ------------------------------------------------------------------------------------------  PHYSICAL EXAM:  GEN: NAD, sitting in bed  HEENT: NCAT, EOMI  CV: bradycardic, Ns1/s2, no m/r/g, JVP not elevated  RESP: CTA B/l, no w/r/r  ABD: soft, nt, nd  EXT: warm, 2+ pulses, no edema  SKIN: no visible lesions, rashes  NEURO: AAOx3, no focal deficits  PSYCH: Calm, cooperative  -------------------------------------------------------------------------------------------  LABS:                          12.8   9.45  )-----------( 239      ( 03 Jan 2024 06:45 )             39.5     01-02    141  |  107  |  21  ----------------------------<  91  4.2   |  26  |  1.32<H>    Ca    9.1      02 Jan 2024 20:18    TPro  6.0  /  Alb  3.9  /  TBili  0.2  /  DBili  x   /  AST  12  /  ALT  11  /  AlkPhos  70  01-02    PT/INR - ( 02 Jan 2024 21:34 )   PT: 11.4 sec;   INR: 1.09 ratio         PTT - ( 02 Jan 2024 21:34 )  PTT:32.3 sec  CARDIAC MARKERS ( 02 Jan 2024 22:47 )  30 ng/L / x     / x     / x     / x     / x      CARDIAC MARKERS ( 02 Jan 2024 20:18 )  30 ng/L / x     / x     / x     / x     / x          -------------------------------------------------------------------------------------------  Meds:  acetaminophen     Tablet .. 975 milliGRAM(s) Oral every 8 hours PRN  bisacodyl 5 milliGRAM(s) Oral at bedtime  pantoprazole    Tablet 40 milliGRAM(s) Oral before breakfast  tamsulosin 0.4 milliGRAM(s) Oral at bedtime    -------------------------------------------------------------------------------------------  Cardiovascular Diagnostic Testing:      -------------------------------------------------------------------------------------------  Assessment and Plan:   83M with PMH of HTN, HLD, CAD, ?pulmonary sarcoid, presenting with 2 weeks of intermittent dizziness on exertion, noted to be in 2:1 AVB for which EP is consulted.     #2:1 AV block  Pt p/w dizziness found to be in 2:1 AV block. 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 Lactate reassuringly normal. Likely that block is 2/2 to age related conduction disease, however unclear if pt has sarcoid given underlying pulm sarcoid. Regardless, given patient is symptomatic, PPM is indicated.    Recommendations:  - f/up TTE  - keep NPO for possible PPM today vs tomorrow, hold DVT ppx  - please check TSH  - avoid AV lyn blocking agents  - K >4, Mg >2  - monitor on tele    *** Recommendations are preliminary until cosigned by the attending.    Cayden Aranda MD  Cardiology Fellow    For all New Consults and Questions:  [email or web address]   Login: [email or web address] Cardiology Progress Note  ------------------------------------------------------------------------------------------    SUBJECTIVE/EVENTS:  - Tele: 2:1 AV block w/ intermittent NSR  - NAEO    -------------------------------------------------------------------------------------------  ROS:  CV: chest pain (-), palpitation (-), orthopnea (-), PND (-), edema (-)  PULM: SOB (-), cough (-), wheezing (-), hemoptysis (-).   CONST: fever (-), chills (-) or fatigue (-)  GI: abdominal distension (-), abdominal pain (-) , nausea/vomiting (-), hematemesis, (-), melena (-), hematochezia (-)  : dysuria (-), frequency (-), hematuria (-).   NEURO: numbness (-), weakness (-), dizziness (-)  MSK: myalgia (-), joint pain (-)   SKIN: itching (-), rash (-)  HEENT:  visual changes (-); vertigo or throat pain (-);  neck stiffness (-)   Psych: change in mood (-), anxiety (-), depression (-)     All other review of systems is negative unless indicated above.   -------------------------------------------------------------------------------------------  VITALS:  T(F): 97.7 (01-03), Max: 98.4 (01-03)  HR: 41 (01-03) (41 - 55)  BP: 149/68 (01-03) (132/72 - 178/74)  RR: 18 (01-03)  SpO2: 96% (01-03)  I&O's Summary    ------------------------------------------------------------------------------------------  PHYSICAL EXAM:  GEN: NAD, sitting in bed  HEENT: NCAT, EOMI  CV: bradycardic, Ns1/s2, no m/r/g, JVP not elevated  RESP: CTA B/l, no w/r/r  ABD: soft, nt, nd  EXT: warm, 2+ pulses, no edema  SKIN: no visible lesions, rashes  NEURO: AAOx3, no focal deficits  PSYCH: Calm, cooperative  -------------------------------------------------------------------------------------------  LABS:                          12.8   9.45  )-----------( 239      ( 03 Jan 2024 06:45 )             39.5     01-02    141  |  107  |  21  ----------------------------<  91  4.2   |  26  |  1.32<H>    Ca    9.1      02 Jan 2024 20:18    TPro  6.0  /  Alb  3.9  /  TBili  0.2  /  DBili  x   /  AST  12  /  ALT  11  /  AlkPhos  70  01-02    PT/INR - ( 02 Jan 2024 21:34 )   PT: 11.4 sec;   INR: 1.09 ratio         PTT - ( 02 Jan 2024 21:34 )  PTT:32.3 sec  CARDIAC MARKERS ( 02 Jan 2024 22:47 )  30 ng/L / x     / x     / x     / x     / x      CARDIAC MARKERS ( 02 Jan 2024 20:18 )  30 ng/L / x     / x     / x     / x     / x          -------------------------------------------------------------------------------------------  Meds:  acetaminophen     Tablet .. 975 milliGRAM(s) Oral every 8 hours PRN  bisacodyl 5 milliGRAM(s) Oral at bedtime  pantoprazole    Tablet 40 milliGRAM(s) Oral before breakfast  tamsulosin 0.4 milliGRAM(s) Oral at bedtime    -------------------------------------------------------------------------------------------  Cardiovascular Diagnostic Testing:      -------------------------------------------------------------------------------------------  Assessment and Plan:   83M with PMH of HTN, HLD, CAD, ?pulmonary sarcoid, presenting with 2 weeks of intermittent dizziness on exertion, noted to be in 2:1 AVB for which EP is consulted.     #2:1 AV block  Pt p/w dizziness found to be in 2:1 AV block. 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 Lactate reassuringly normal. Likely that block is 2/2 to age related conduction disease, however unclear if pt has sarcoid given underlying pulm sarcoid. Regardless, given patient is symptomatic, PPM is indicated.    Recommendations:  - f/up TTE  - keep NPO for possible PPM today vs tomorrow, hold DVT ppx  - please check TSH  - avoid AV lyn blocking agents  - K >4, Mg >2  - monitor on tele    *** Recommendations are preliminary until cosigned by the attending.    Cayden Aranda MD  Cardiology Fellow    For all New Consults and Questions:  [email or web address]   Login: [email or web address] Cardiology Progress Note  ------------------------------------------------------------------------------------------    SUBJECTIVE/EVENTS:  - Tele: 2:1 AV block w/ intermittent NSR  - NAEO    -------------------------------------------------------------------------------------------  ROS:  CV: chest pain (-), palpitation (-), orthopnea (-), PND (-), edema (-)  PULM: SOB (-), cough (-), wheezing (-), hemoptysis (-).   CONST: fever (-), chills (-) or fatigue (-)  GI: abdominal distension (-), abdominal pain (-) , nausea/vomiting (-), hematemesis, (-), melena (-), hematochezia (-)  : dysuria (-), frequency (-), hematuria (-).   NEURO: numbness (-), weakness (-), dizziness (-)  MSK: myalgia (-), joint pain (-)   SKIN: itching (-), rash (-)  HEENT:  visual changes (-); vertigo or throat pain (-);  neck stiffness (-)   Psych: change in mood (-), anxiety (-), depression (-)     All other review of systems is negative unless indicated above.   -------------------------------------------------------------------------------------------  VITALS:  T(F): 97.7 (01-03), Max: 98.4 (01-03)  HR: 41 (01-03) (41 - 55)  BP: 149/68 (01-03) (132/72 - 178/74)  RR: 18 (01-03)  SpO2: 96% (01-03)  I&O's Summary    ------------------------------------------------------------------------------------------  PHYSICAL EXAM:  GEN: NAD, sitting in bed  HEENT: NCAT, EOMI  CV: bradycardic, Ns1/s2, no m/r/g, JVP not elevated  RESP: CTA B/l, no w/r/r  ABD: soft, nt, nd  EXT: warm, 2+ pulses, no edema  SKIN: no visible lesions, rashes  NEURO: AAOx3, no focal deficits  PSYCH: Calm, cooperative  -------------------------------------------------------------------------------------------  LABS:                          12.8   9.45  )-----------( 239      ( 03 Jan 2024 06:45 )             39.5     01-02    141  |  107  |  21  ----------------------------<  91  4.2   |  26  |  1.32<H>    Ca    9.1      02 Jan 2024 20:18    TPro  6.0  /  Alb  3.9  /  TBili  0.2  /  DBili  x   /  AST  12  /  ALT  11  /  AlkPhos  70  01-02    PT/INR - ( 02 Jan 2024 21:34 )   PT: 11.4 sec;   INR: 1.09 ratio         PTT - ( 02 Jan 2024 21:34 )  PTT:32.3 sec  CARDIAC MARKERS ( 02 Jan 2024 22:47 )  30 ng/L / x     / x     / x     / x     / x      CARDIAC MARKERS ( 02 Jan 2024 20:18 )  30 ng/L / x     / x     / x     / x     / x          -------------------------------------------------------------------------------------------  Meds:  acetaminophen     Tablet .. 975 milliGRAM(s) Oral every 8 hours PRN  bisacodyl 5 milliGRAM(s) Oral at bedtime  pantoprazole    Tablet 40 milliGRAM(s) Oral before breakfast  tamsulosin 0.4 milliGRAM(s) Oral at bedtime    -------------------------------------------------------------------------------------------  Cardiovascular Diagnostic Testing:      -------------------------------------------------------------------------------------------  Assessment and Plan:   83M with PMH of HTN, HLD, CAD, ?pulmonary sarcoid, presenting with 2 weeks of intermittent dizziness on exertion, noted to be in 2:1 AVB for which EP is consulted.     #2:1 AV block  Pt p/w dizziness found to be in 2:1 AV block. 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 Lactate reassuringly normal. Likely that block is 2/2 to age related conduction disease, however unclear if pt has sarcoid given underlying pulm sarcoid. Regardless, given patient is symptomatic, PPM is indicated.    Recommendations:  - f/up TTE  - keep NPO for possible PPM today following TTE, hold DVT ppx  - please check TSH  - avoid AV lyn blocking agents  - K >4, Mg >2  - monitor on tele    *** Recommendations are preliminary until cosigned by the attending.    Cayden Aranda MD  Cardiology Fellow    For all New Consults and Questions:  [email or web address]   Login: [email or web address] Cardiology Progress Note  ------------------------------------------------------------------------------------------    SUBJECTIVE/EVENTS:  - Tele: 2:1 AV block w/ intermittent NSR  - NAEO    -------------------------------------------------------------------------------------------  ROS:  CV: chest pain (-), palpitation (-), orthopnea (-), PND (-), edema (-)  PULM: SOB (-), cough (-), wheezing (-), hemoptysis (-).   CONST: fever (-), chills (-) or fatigue (-)  GI: abdominal distension (-), abdominal pain (-) , nausea/vomiting (-), hematemesis, (-), melena (-), hematochezia (-)  : dysuria (-), frequency (-), hematuria (-).   NEURO: numbness (-), weakness (-), dizziness (-)  MSK: myalgia (-), joint pain (-)   SKIN: itching (-), rash (-)  HEENT:  visual changes (-); vertigo or throat pain (-);  neck stiffness (-)   Psych: change in mood (-), anxiety (-), depression (-)     All other review of systems is negative unless indicated above.   -------------------------------------------------------------------------------------------  VITALS:  T(F): 97.7 (01-03), Max: 98.4 (01-03)  HR: 41 (01-03) (41 - 55)  BP: 149/68 (01-03) (132/72 - 178/74)  RR: 18 (01-03)  SpO2: 96% (01-03)  I&O's Summary    ------------------------------------------------------------------------------------------  PHYSICAL EXAM:  GEN: NAD, sitting in bed  HEENT: NCAT, EOMI  CV: bradycardic, Ns1/s2, no m/r/g, JVP not elevated  RESP: CTA B/l, no w/r/r  ABD: soft, nt, nd  EXT: warm, 2+ pulses, no edema  SKIN: no visible lesions, rashes  NEURO: AAOx3, no focal deficits  PSYCH: Calm, cooperative  -------------------------------------------------------------------------------------------  LABS:                          12.8   9.45  )-----------( 239      ( 03 Jan 2024 06:45 )             39.5     01-02    141  |  107  |  21  ----------------------------<  91  4.2   |  26  |  1.32<H>    Ca    9.1      02 Jan 2024 20:18    TPro  6.0  /  Alb  3.9  /  TBili  0.2  /  DBili  x   /  AST  12  /  ALT  11  /  AlkPhos  70  01-02    PT/INR - ( 02 Jan 2024 21:34 )   PT: 11.4 sec;   INR: 1.09 ratio         PTT - ( 02 Jan 2024 21:34 )  PTT:32.3 sec  CARDIAC MARKERS ( 02 Jan 2024 22:47 )  30 ng/L / x     / x     / x     / x     / x      CARDIAC MARKERS ( 02 Jan 2024 20:18 )  30 ng/L / x     / x     / x     / x     / x          -------------------------------------------------------------------------------------------  Meds:  acetaminophen     Tablet .. 975 milliGRAM(s) Oral every 8 hours PRN  bisacodyl 5 milliGRAM(s) Oral at bedtime  pantoprazole    Tablet 40 milliGRAM(s) Oral before breakfast  tamsulosin 0.4 milliGRAM(s) Oral at bedtime    -------------------------------------------------------------------------------------------  Cardiovascular Diagnostic Testing:      -------------------------------------------------------------------------------------------  Assessment and Plan:   83M with PMH of HTN, HLD, CAD, ?pulmonary sarcoid, presenting with 2 weeks of intermittent dizziness on exertion, noted to be in 2:1 AVB for which EP is consulted.     #2:1 AV block  Pt p/w dizziness found to be in 2:1 AV block. 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 Lactate reassuringly normal. Likely that block is 2/2 to age related conduction disease, however unclear if pt has sarcoid given underlying pulm sarcoid. Regardless, given patient is symptomatic, PPM is indicated.    Recommendations:  - f/up TTE  - keep NPO for possible PPM today following TTE, hold DVT ppx  - please check TSH  - avoid AV lyn blocking agents  - K >4, Mg >2  - monitor on tele    *** Recommendations are preliminary until cosigned by the attending.    Cayden Aranda MD  Cardiology Fellow    For all New Consults and Questions:  [email or web address]   Login: [employer] [facility] Progress Note  ------------------------------------------------------------------------------------------    SUBJECTIVE/EVENTS:  - Tele: 2:1 AV block w/ intermittent NSR  - NAEO    -------------------------------------------------------------------------------------------  ROS:  CV: chest pain (-), palpitation (-), orthopnea (-), PND (-), edema (-)  PULM: SOB (-), cough (-), wheezing (-), hemoptysis (-).   CONST: fever (-), chills (-) or fatigue (-)  GI: abdominal distension (-), abdominal pain (-) , nausea/vomiting (-), hematemesis, (-), melena (-), hematochezia (-)  : dysuria (-), frequency (-), hematuria (-).   NEURO: numbness (-), weakness (-), dizziness (-)  MSK: myalgia (-), joint pain (-)   SKIN: itching (-), rash (-)  HEENT:  visual changes (-); vertigo or throat pain (-);  neck stiffness (-)   Psych: change in mood (-), anxiety (-), depression (-)     All other review of systems is negative unless indicated above.   -------------------------------------------------------------------------------------------  VITALS:  T(F): 97.7 (01-03), Max: 98.4 (01-03)  HR: 41 (01-03) (41 - 55)  BP: 149/68 (01-03) (132/72 - 178/74)  RR: 18 (01-03)  SpO2: 96% (01-03)  I&O's Summary    ------------------------------------------------------------------------------------------  PHYSICAL EXAM:  GEN: NAD, sitting in bed  HEENT: NCAT, EOMI  CV: bradycardic, Ns1/s2, no m/r/g, JVP not elevated  RESP: CTA B/l, no w/r/r  ABD: soft, nt, nd  EXT: warm, 2+ pulses, no edema  SKIN: no visible lesions, rashes  NEURO: AAOx3, no focal deficits  PSYCH: Calm, cooperative  -------------------------------------------------------------------------------------------  LABS:                          12.8   9.45  )-----------( 239      ( 03 Jan 2024 06:45 )             39.5     01-02    141  |  107  |  21  ----------------------------<  91  4.2   |  26  |  1.32<H>    Ca    9.1      02 Jan 2024 20:18    TPro  6.0  /  Alb  3.9  /  TBili  0.2  /  DBili  x   /  AST  12  /  ALT  11  /  AlkPhos  70  01-02    PT/INR - ( 02 Jan 2024 21:34 )   PT: 11.4 sec;   INR: 1.09 ratio         PTT - ( 02 Jan 2024 21:34 )  PTT:32.3 sec  CARDIAC MARKERS ( 02 Jan 2024 22:47 )  30 ng/L / x     / x     / x     / x     / x      CARDIAC MARKERS ( 02 Jan 2024 20:18 )  30 ng/L / x     / x     / x     / x     / x          -------------------------------------------------------------------------------------------  Meds:  acetaminophen     Tablet .. 975 milliGRAM(s) Oral every 8 hours PRN  bisacodyl 5 milliGRAM(s) Oral at bedtime  pantoprazole    Tablet 40 milliGRAM(s) Oral before breakfast  tamsulosin 0.4 milliGRAM(s) Oral at bedtime    -------------------------------------------------------------------------------------------  Cardiovascular Diagnostic Testing:      -------------------------------------------------------------------------------------------  Assessment and Plan:   83M with PMH of HTN, HLD, CAD, ?pulmonary sarcoid, presenting with 2 weeks of intermittent dizziness on exertion, noted to be in 2:1 AVB for which EP is consulted.     #2:1 AV block  Pt p/w dizziness found to be in 2:1 AV block. 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 Lactate reassuringly normal. Likely that block is 2/2 to age related conduction disease, however unclear if pt has sarcoid given underlying pulm sarcoid. Regardless, given patient is symptomatic, PPM is indicated.    Recommendations:  - f/up TTE  - keep NPO for possible PPM today following TTE, hold DVT ppx  - please check TSH  - avoid AV lyn blocking agents  - K >4, Mg >2  - monitor on tele    *** Recommendations are preliminary until cosigned by the attending.    Cayden Aranda MD  Cardiology Fellow    For all New Consults and Questions:  [email or web address]   Login: [email or web address]

## 2024-01-03 NOTE — PRE-ANESTHESIA EVALUATION ADULT - NSANTHPMHFT_GEN_ALL_CORE
Medical history and ROS reviewed  HTN, HLD, CKD, TIA, pulmonary sarcoidosis, CAD s/p MI in 2003, no stents, EF 50-55%, presented in 2:1 AV block  On RA at home, on 4L NC currently

## 2024-01-03 NOTE — CONSULT NOTE ADULT - SUBJECTIVE AND OBJECTIVE BOX
CARDIOLOGY CONSULT - Dr. Rondon         HPI:  83M w/ hx of HTN, HLD, CKD3, CAD c/b MI ( cath at [facility], reportedly 80% stenosis but no stent), mild LV diastolic dysfunction, ?pulmonary sarcoid, prostate cancer s/p radiation, ?TIA/amnesia episodes, chronic migraines, OA, incarcerated in [place] ([phone]), presenting with intermittent dizziness/lightheadedness x2 wks and bradycardia to HR 30s earlier in the day. Also endorsed fatigue. Not on BB at home. Noted he has had slow HRs in the past, initially found in Aug 2022 when he was being worked up for a lung biopsy for sarcoidosis (procedure canceled). Reported that he was recommended to get a PPM at the time, but he reportedly walked out. He has been following with cardiology since 2023 (last seen in Aug 2023), underwent several tests and was told he did not need a PPM as he did not have any overt symptoms at the time. In the ED, patient was hypertensive to SBP 170s, HR 30s-40s. EKG showed 2:1 AVB. Labs with normal lactate, creatinine 1.32. Transcutaneous pads placed. Per ED documentation, had received atropine from EMS prior to arrival. No reported syncope episodes.    Per outpatient documentation, prior EKGs had shown NSR with RBBB and 1st degree AVB. Holter monitor (2023) had shown "43 pauses >2 sec longest 2.2 sec; 2% isolated PVC." TTE (2/15/23) showed LVEF 72%, mild (grade I) diastolic dysfunction, and mild-mod AR. Lexiscan stress test (3/2023) was neg for ischemia. CT chest aorta (2023) showed tortuous 3.7cm aorta with pulm nodules and hilar adenopathy likely in setting of sarcoid and unchanged from prior CT. TSH was previously wnl (though borderline high-normal at 4.14 in 2023).    Admitted to Medicine for further management. (2024 23:33)      PAST MEDICAL & SURGICAL HISTORY:  HTN (hypertension)      HLD (hyperlipidemia)      CAD (coronary artery disease)      History of myocardial infarction      Transient amnesia      History of TIA (transient ischemic attack)      Chronic constipation      Pulmonary sarcoidosis      Prostate cancer      History of incarceration      Stage 3 chronic kidney disease      History of hemorrhoidectomy        PREVIOUS DIAGNOSTIC TESTING:    [ ] Echocardiogram:  [ ]  Catheterization:  [ ] Stress Test:  	    MEDICATIONS:  Home Medications:  aspirin 81 mg oral tablet: 1 tab(s) orally once a day (:17)  Dulcolax Laxative 5 mg oral tablet: 1 tab(s) orally once a day (at bedtime) (:17)  losartan 100 mg oral tablet: 1 tab(s) orally once a day (:17)  OTC generic aleve/NSAID: Takes 1 tab daily (:17)  Protonix 40 mg oral delayed release tablet: 1 tab(s) orally once a day (:17)  tamsulosin 0.4 mg oral capsule: 1 cap(s) orally once a day (at bedtime) (:17)      MEDICATIONS  (STANDING):  bisacodyl 5 milliGRAM(s) Oral at bedtime  pantoprazole    Tablet 40 milliGRAM(s) Oral before breakfast  tamsulosin 0.4 milliGRAM(s) Oral at bedtime      FAMILY HISTORY:  No pertinent family history in first degree relatives        SOCIAL HISTORY:    [x] Non-smoker  [ ] Smoker  [ ] Alcohol    Allergies    No Known Allergies    Intolerances    	    REVIEW OF SYSTEMS:  CONSTITUTIONAL: No fever, weight loss, or fatigue  EYES: No eye pain, visual disturbances, or discharge  ENMT:  No difficulty hearing, tinnitus, vertigo; No sinus or throat pain  NECK: No pain or stiffness  RESPIRATORY: No cough, wheezing, chills or hemoptysis; No Shortness of Breath  CARDIOVASCULAR: No chest pain, +Dizziness, +Lightheadedness  GASTROINTESTINAL: No abdominal or epigastric pain. No nausea, vomiting, or hematemesis; No diarrhea or constipation. No melena or hematochezia.  GENITOURINARY: No dysuria, frequency, hematuria, or incontinence  NEUROLOGICAL: No headaches, memory loss, loss of strength, numbness, or tremors  SKIN: No itching, burning, rashes, or lesions   	    [x] All others negative	  [ ] Unable to obtain    PHYSICAL EXAM:  T(C): 36.5 (24 @ 05:08), Max: 36.9 (24 @ 01:08)  HR: 41 (24 @ 05:08) (41 - 55)  BP: 149/68 (24 @ 05:08) (132/72 - 178/74)  RR: 18 (24 @ 05:08) (18 - 20)  SpO2: 96% (24 @ 05:08) (96% - 98%)  Wt(kg): --  I&O's Summary      Appearance: Elderly male 	  Psychiatry: A & O x 3, Mood & affect appropriate  HEENT:   Normal oral mucosa, PERRL, EOMI	  Lymphatic: No lymphadenopathy  Cardiovascular: Normal S1 S2,RRR, No JVD, No murmurs  Respiratory: Lungs clear to auscultation b/l   Gastrointestinal:  Soft, Non-tender, + BS	  Skin: No rashes, No ecchymoses, No cyanosis	  Neurologic: Non-focal  Extremities: Normal range of motion, No clubbing, cyanosis or edema  Vascular: Peripheral pulses palpable 2+ bilaterally    TELEMETRY: Sinus jeannette 40-50s	    EC:1 AV block 38bpm	  RADIOLOGY:  < from: Xray Chest 1 View- PORTABLE-Urgent (24 @ 21:02) >  FINDINGS:  Pacer pad overlies the right chestwall.  The heart size is normal.  No focal consolidation.  No pleural effusion.  No pneumothorax.  No acute bony abnormality.    IMPRESSION:  No focal consolidation.    < end of copied text >    OTHER: 	  	  LABS:	 	    CARDIAC MARKERS:  Troponin T, High Sensitivity Result: 30 ng/L ( @ 22:47)  Troponin T, High Sensitivity Result: 30 ng/L ( @ 20:18)                                  12.8   9.45  )-----------( 239      ( 2024 06:45 )             39.5         142  |  110<H>  |  21  ----------------------------<  86  4.2   |  24  |  1.32<H>    Ca    9.4      2024 06:45  Phos  3.2       Mg     2.1         TPro  5.8<L>  /  Alb  3.5  /  TBili  0.4  /  DBili  x   /  AST  13  /  ALT  8<L>  /  AlkPhos  71      PT/INR - ( 2024 21:34 )   PT: 11.4 sec;   INR: 1.09 ratio         PTT - ( 2024 21:34 )  PTT:32.3 sec  proBNP:   Lipid Profile:   HgA1c:   TSH: Thyroid Stimulating Hormone, Serum: 5.73 uIU/mL ( @ 20:17)         CARDIOLOGY CONSULT - Dr. Rondon         HPI:  83M w/ hx of HTN, HLD, CKD3, CAD c/b MI ( cath at [facility], reportedly 80% stenosis but no stent), mild LV diastolic dysfunction, ?pulmonary sarcoid, prostate cancer s/p radiation, ?TIA/amnesia episodes, chronic migraines, OA, incarcerated in [place] ([phone]), presenting with intermittent dizziness/lightheadedness x2 wks and bradycardia to HR 30s earlier in the day. Also endorsed fatigue. Not on BB at home. Noted he has had slow HRs in the past, initially found in Aug 2022 when he was being worked up for a lung biopsy for sarcoidosis (procedure canceled). Reported that he was recommended to get a PPM at the time, but he reportedly walked out. He has been following with cardiology since 2023 (last seen in Aug 2023), underwent several tests and was told he did not need a PPM as he did not have any overt symptoms at the time. In the ED, patient was hypertensive to SBP 170s, HR 30s-40s. EKG showed 2:1 AVB. Labs with normal lactate, creatinine 1.32. Transcutaneous pads placed. Per ED documentation, had received atropine from EMS prior to arrival. No reported syncope episodes.    Per outpatient documentation, prior EKGs had shown NSR with RBBB and 1st degree AVB. Holter monitor (2023) had shown "43 pauses >2 sec longest 2.2 sec; 2% isolated PVC." TTE (2/15/23) showed LVEF 72%, mild (grade I) diastolic dysfunction, and mild-mod AR. Lexiscan stress test (3/2023) was neg for ischemia. CT chest aorta (2023) showed tortuous 3.7cm aorta with pulm nodules and hilar adenopathy likely in setting of sarcoid and unchanged from prior CT. TSH was previously wnl (though borderline high-normal at 4.14 in 2023).    Admitted to Medicine for further management. (2024 23:33)      PAST MEDICAL & SURGICAL HISTORY:  HTN (hypertension)      HLD (hyperlipidemia)      CAD (coronary artery disease)      History of myocardial infarction      Transient amnesia      History of TIA (transient ischemic attack)      Chronic constipation      Pulmonary sarcoidosis      Prostate cancer      History of incarceration      Stage 3 chronic kidney disease      History of hemorrhoidectomy        PREVIOUS DIAGNOSTIC TESTING:    [ ] Echocardiogram:  [ ]  Catheterization:  [ ] Stress Test:  	    MEDICATIONS:  Home Medications:  aspirin 81 mg oral tablet: 1 tab(s) orally once a day (:17)  Dulcolax Laxative 5 mg oral tablet: 1 tab(s) orally once a day (at bedtime) (:17)  losartan 100 mg oral tablet: 1 tab(s) orally once a day (:17)  OTC generic aleve/NSAID: Takes 1 tab daily (:17)  Protonix 40 mg oral delayed release tablet: 1 tab(s) orally once a day (:17)  tamsulosin 0.4 mg oral capsule: 1 cap(s) orally once a day (at bedtime) (:17)      MEDICATIONS  (STANDING):  bisacodyl 5 milliGRAM(s) Oral at bedtime  pantoprazole    Tablet 40 milliGRAM(s) Oral before breakfast  tamsulosin 0.4 milliGRAM(s) Oral at bedtime      FAMILY HISTORY:  No pertinent family history in first degree relatives        SOCIAL HISTORY:    [x] Non-smoker  [ ] Smoker  [ ] Alcohol    Allergies    No Known Allergies    Intolerances    	    REVIEW OF SYSTEMS:  CONSTITUTIONAL: No fever, weight loss, or fatigue  EYES: No eye pain, visual disturbances, or discharge  ENMT:  No difficulty hearing, tinnitus, vertigo; No sinus or throat pain  NECK: No pain or stiffness  RESPIRATORY: No cough, wheezing, chills or hemoptysis; No Shortness of Breath  CARDIOVASCULAR: No chest pain, +Dizziness, +Lightheadedness  GASTROINTESTINAL: No abdominal or epigastric pain. No nausea, vomiting, or hematemesis; No diarrhea or constipation. No melena or hematochezia.  GENITOURINARY: No dysuria, frequency, hematuria, or incontinence  NEUROLOGICAL: No headaches, memory loss, loss of strength, numbness, or tremors  SKIN: No itching, burning, rashes, or lesions   	    [x] All others negative	  [ ] Unable to obtain    PHYSICAL EXAM:  T(C): 36.5 (24 @ 05:08), Max: 36.9 (24 @ 01:08)  HR: 41 (24 @ 05:08) (41 - 55)  BP: 149/68 (24 @ 05:08) (132/72 - 178/74)  RR: 18 (24 @ 05:08) (18 - 20)  SpO2: 96% (24 @ 05:08) (96% - 98%)  Wt(kg): --  I&O's Summary      Appearance: Elderly male 	  Psychiatry: A & O x 3, Mood & affect appropriate  HEENT:   Normal oral mucosa, PERRL, EOMI	  Lymphatic: No lymphadenopathy  Cardiovascular: Normal S1 S2,RRR, No JVD, No murmurs  Respiratory: Lungs clear to auscultation b/l   Gastrointestinal:  Soft, Non-tender, + BS	  Skin: No rashes, No ecchymoses, No cyanosis	  Neurologic: Non-focal  Extremities: Normal range of motion, No clubbing, cyanosis or edema  Vascular: Peripheral pulses palpable 2+ bilaterally    TELEMETRY: Sinus jeannette 40-50s	    EC:1 AV block 38bpm	  RADIOLOGY:  < from: Xray Chest 1 View- PORTABLE-Urgent (24 @ 21:02) >  FINDINGS:  Pacer pad overlies the right chestwall.  The heart size is normal.  No focal consolidation.  No pleural effusion.  No pneumothorax.  No acute bony abnormality.    IMPRESSION:  No focal consolidation.    < end of copied text >    OTHER: 	  	  LABS:	 	    CARDIAC MARKERS:  Troponin T, High Sensitivity Result: 30 ng/L ( @ 22:47)  Troponin T, High Sensitivity Result: 30 ng/L ( @ 20:18)                                  12.8   9.45  )-----------( 239      ( 2024 06:45 )             39.5         142  |  110<H>  |  21  ----------------------------<  86  4.2   |  24  |  1.32<H>    Ca    9.4      2024 06:45  Phos  3.2       Mg     2.1         TPro  5.8<L>  /  Alb  3.5  /  TBili  0.4  /  DBili  x   /  AST  13  /  ALT  8<L>  /  AlkPhos  71      PT/INR - ( 2024 21:34 )   PT: 11.4 sec;   INR: 1.09 ratio         PTT - ( 2024 21:34 )  PTT:32.3 sec  proBNP:   Lipid Profile:   HgA1c:   TSH: Thyroid Stimulating Hormone, Serum: 5.73 uIU/mL ( @ 20:17)         CARDIOLOGY CONSULT - Dr. Rondon         HPI:  83M w/ hx of HTN, HLD, CKD3, CAD c/b MI ( cath at [facility], reportedly 80% stenosis but no stent), mild LV diastolic dysfunction, ?pulmonary sarcoid, prostate cancer s/p radiation, ?TIA/amnesia episodes, chronic migraines, OA, incarcerated in [place] ([phone]), presenting with intermittent dizziness/lightheadedness x2 wks and bradycardia to HR 30s earlier in the day. Also endorsed fatigue. Not on BB at home. Noted he has had slow HRs in the past, initially found in Aug 2022 when he was being worked up for a lung biopsy for sarcoidosis (procedure canceled). Reported that he was recommended to get a PPM at the time, but he reportedly walked out. He has been following with cardiology since 2023 (last seen in Aug 2023), underwent several tests and was told he did not need a PPM as he did not have any overt symptoms at the time. In the ED, patient was hypertensive to SBP 170s, HR 30s-40s. EKG showed 2:1 AVB. Labs with normal lactate, creatinine 1.32. Transcutaneous pads placed. Per ED documentation, had received atropine from EMS prior to arrival. No reported syncope episodes.    Per outpatient documentation, prior EKGs had shown NSR with RBBB and 1st degree AVB. Holter monitor (2023) had shown "43 pauses >2 sec longest 2.2 sec; 2% isolated PVC." TTE (2/15/23) showed LVEF 72%, mild (grade I) diastolic dysfunction, and mild-mod AR. Lexiscan stress test (3/2023) was neg for ischemia. CT chest aorta (2023) showed tortuous 3.7cm aorta with pulm nodules and hilar adenopathy likely in setting of sarcoid and unchanged from prior CT. TSH was previously wnl (though borderline high-normal at 4.14 in 2023).    Admitted to Medicine for further management. (2024 23:33)      PAST MEDICAL & SURGICAL HISTORY:  HTN (hypertension)      HLD (hyperlipidemia)      CAD (coronary artery disease)      History of myocardial infarction      Transient amnesia      History of TIA (transient ischemic attack)      Chronic constipation      Pulmonary sarcoidosis      Prostate cancer      History of incarceration      Stage 3 chronic kidney disease      History of hemorrhoidectomy        PREVIOUS DIAGNOSTIC TESTING:    [ ] Echocardiogram:  [ ]  Catheterization:  [ ] Stress Test:  	    MEDICATIONS:  Home Medications:  aspirin 81 mg oral tablet: 1 tab(s) orally once a day (:17)  Dulcolax Laxative 5 mg oral tablet: 1 tab(s) orally once a day (at bedtime) (:17)  losartan 100 mg oral tablet: 1 tab(s) orally once a day (:17)  OTC generic aleve/NSAID: Takes 1 tab daily (:17)  Protonix 40 mg oral delayed release tablet: 1 tab(s) orally once a day (:17)  tamsulosin 0.4 mg oral capsule: 1 cap(s) orally once a day (at bedtime) (:17)      MEDICATIONS  (STANDING):  bisacodyl 5 milliGRAM(s) Oral at bedtime  pantoprazole    Tablet 40 milliGRAM(s) Oral before breakfast  tamsulosin 0.4 milliGRAM(s) Oral at bedtime      FAMILY HISTORY:  No pertinent family history in first degree relatives        SOCIAL HISTORY:    [x] Non-smoker  [ ] Smoker  [ ] Alcohol    Allergies    No Known Allergies    Intolerances    	    REVIEW OF SYSTEMS:  CONSTITUTIONAL: No fever, weight loss, or fatigue  EYES: No eye pain, visual disturbances, or discharge  ENMT:  No difficulty hearing, tinnitus, vertigo; No sinus or throat pain  NECK: No pain or stiffness  RESPIRATORY: No cough, wheezing, chills or hemoptysis; No Shortness of Breath  CARDIOVASCULAR: No chest pain, +Dizziness, +Lightheadedness  GASTROINTESTINAL: No abdominal or epigastric pain. No nausea, vomiting, or hematemesis; No diarrhea or constipation. No melena or hematochezia.  GENITOURINARY: No dysuria, frequency, hematuria, or incontinence  NEUROLOGICAL: No headaches, memory loss, loss of strength, numbness, or tremors  SKIN: No itching, burning, rashes, or lesions   	    [x] All others negative	  [ ] Unable to obtain    PHYSICAL EXAM:  T(C): 36.5 (24 @ 05:08), Max: 36.9 (24 @ 01:08)  HR: 41 (24 @ 05:08) (41 - 55)  BP: 149/68 (24 @ 05:08) (132/72 - 178/74)  RR: 18 (24 @ 05:08) (18 - 20)  SpO2: 96% (24 @ 05:08) (96% - 98%)  Wt(kg): --  I&O's Summary      Appearance: Elderly male 	  Psychiatry: A & O x 3, Mood & affect appropriate  HEENT:   Normal oral mucosa, PERRL, EOMI	  Lymphatic: No lymphadenopathy  Cardiovascular: Normal S1 S2,RRR, No JVD, No murmurs  Respiratory: Lungs clear to auscultation b/l   Gastrointestinal:  Soft, Non-tender, + BS	  Skin: No rashes, No ecchymoses, No cyanosis	  Neurologic: Non-focal  Extremities: Normal range of motion, No clubbing, cyanosis or edema  Vascular: Peripheral pulses palpable 2+ bilaterally    TELEMETRY: Sinus jeannette 40-50s	    EC:1 AV block 38bpm	  RADIOLOGY:  < from: Xray Chest 1 View- PORTABLE-Urgent (24 @ 21:02) >  FINDINGS:  Pacer pad overlies the right chestwall.  The heart size is normal.  No focal consolidation.  No pleural effusion.  No pneumothorax.  No acute bony abnormality.    IMPRESSION:  No focal consolidation.    < end of copied text >    OTHER: 	  	  LABS:	 	    CARDIAC MARKERS:  Troponin T, High Sensitivity Result: 30 ng/L ( @ 22:47)  Troponin T, High Sensitivity Result: 30 ng/L ( @ 20:18)                                  12.8   9.45  )-----------( 239      ( 2024 06:45 )             39.5         142  |  110<H>  |  21  ----------------------------<  86  4.2   |  24  |  1.32<H>    Ca    9.4      2024 06:45  Phos  3.2       Mg     2.1         TPro  5.8<L>  /  Alb  3.5  /  TBili  0.4  /  DBili  x   /  AST  13  /  ALT  8<L>  /  AlkPhos  71      PT/INR - ( 2024 21:34 )   PT: 11.4 sec;   INR: 1.09 ratio         PTT - ( 2024 21:34 )  PTT:32.3 sec  proBNP:   Lipid Profile:   HgA1c:   TSH: Thyroid Stimulating Hormone, Serum: 5.73 uIU/mL ( @ 20:17)

## 2024-01-03 NOTE — PROGRESS NOTE ADULT - SUBJECTIVE AND OBJECTIVE BOX
SUBJECTIVE / OVERNIGHT EVENTS:          --------------------------------------------------------------------------------------------  LABS:                        12.8   9.45  )-----------( 239      ( 2024 06:45 )             39.5     01-    142  |  110<H>  |  21  ----------------------------<  86  4.2   |  24  |  1.32<H>    Ca    9.4      2024 06:45  Phos  3.2       Mg     2.1         TPro  5.8<L>  /  Alb  3.5  /  TBili  0.4  /  DBili  x   /  AST  13  /  ALT  8<L>  /  AlkPhos  71  -03    PT/INR - ( 2024 21:34 )   PT: 11.4 sec;   INR: 1.09 ratio         PTT - ( 2024 21:34 )  PTT:32.3 sec  CAPILLARY BLOOD GLUCOSE            Urinalysis Basic - ( 2024 06:48 )    Color: Yellow / Appearance: Clear / S.015 / pH: x  Gluc: x / Ketone: Negative mg/dL  / Bili: Negative / Urobili: 0.2 mg/dL   Blood: x / Protein: Negative mg/dL / Nitrite: Negative   Leuk Esterase: Negative / RBC: 0 /HPF / WBC 1 /HPF   Sq Epi: x / Non Sq Epi: 0 /HPF / Bacteria: Negative /HPF        RADIOLOGY & ADDITIONAL TESTS:    Imaging Personally Reviewed:  [x] YES  [ ] NO    Consultant(s) Notes Reviewed:  [x] YES  [ ] NO    MEDICATIONS  (STANDING):  bisacodyl 5 milliGRAM(s) Oral at bedtime  pantoprazole    Tablet 40 milliGRAM(s) Oral before breakfast  tamsulosin 0.4 milliGRAM(s) Oral at bedtime    MEDICATIONS  (PRN):  acetaminophen     Tablet .. 975 milliGRAM(s) Oral every 8 hours PRN Temp greater or equal to 38C (100.4F), Mild Pain (1 - 3), Moderate Pain (4 - 6)      Care Discussed with Consultants/Other Providers [x] YES  [ ] NO    Vital Signs Last 24 Hrs  T(C): 36.5 (2024 05:08), Max: 36.9 (2024 01:08)  T(F): 97.7 (2024 05:08), Max: 98.4 (2024 01:08)  HR: 41 (2024 05:08) (41 - 55)  BP: 149/68 (2024 05:08) (132/72 - 178/74)  BP(mean): --  RR: 18 (2024 05:08) (18 - 20)  SpO2: 96% (2024 05:08) (96% - 98%)    Parameters below as of 2024 05:08  Patient On (Oxygen Delivery Method): nasal cannula  O2 Flow (L/min): 4    I&O's Summary         SUBJECTIVE / OVERNIGHT EVENTS:    patient seen and examined in ED  resting comfortably on stretcher  no c/o at this time    --------------------------------------------------------------------------------------------  LABS:                        12.8   9.45  )-----------( 239      ( 2024 06:45 )             39.5     01-    142  |  110<H>  |  21  ----------------------------<  86  4.2   |  24  |  1.32<H>    Ca    9.4      2024 06:45  Phos  3.2     -  Mg     2.1     -    TPro  5.8<L>  /  Alb  3.5  /  TBili  0.4  /  DBili  x   /  AST  13  /  ALT  8<L>  /  AlkPhos  71  -    PT/INR - ( 2024 21:34 )   PT: 11.4 sec;   INR: 1.09 ratio         PTT - ( 2024 21:34 )  PTT:32.3 sec  CAPILLARY BLOOD GLUCOSE            Urinalysis Basic - ( 2024 06:48 )    Color: Yellow / Appearance: Clear / S.015 / pH: x  Gluc: x / Ketone: Negative mg/dL  / Bili: Negative / Urobili: 0.2 mg/dL   Blood: x / Protein: Negative mg/dL / Nitrite: Negative   Leuk Esterase: Negative / RBC: 0 /HPF / WBC 1 /HPF   Sq Epi: x / Non Sq Epi: 0 /HPF / Bacteria: Negative /HPF        RADIOLOGY & ADDITIONAL TESTS:    Imaging Personally Reviewed:  [x] YES  [ ] NO    Consultant(s) Notes Reviewed:  [x] YES  [ ] NO    MEDICATIONS  (STANDING):  bisacodyl 5 milliGRAM(s) Oral at bedtime  pantoprazole    Tablet 40 milliGRAM(s) Oral before breakfast  tamsulosin 0.4 milliGRAM(s) Oral at bedtime    MEDICATIONS  (PRN):  acetaminophen     Tablet .. 975 milliGRAM(s) Oral every 8 hours PRN Temp greater or equal to 38C (100.4F), Mild Pain (1 - 3), Moderate Pain (4 - 6)      Care Discussed with Consultants/Other Providers [x] YES  [ ] NO    Vital Signs Last 24 Hrs  T(C): 36.5 (2024 05:08), Max: 36.9 (2024 01:08)  T(F): 97.7 (2024 05:08), Max: 98.4 (2024 01:08)  HR: 41 (2024 05:08) (41 - 55)  BP: 149/68 (2024 05:08) (132/72 - 178/74)  BP(mean): --  RR: 18 (2024 05:08) (18 - 20)  SpO2: 96% (2024 05:08) (96% - 98%)    Parameters below as of 2024 05:08  Patient On (Oxygen Delivery Method): nasal cannula  O2 Flow (L/min): 4    I&O's Summary    PHYSICAL EXAM:  GENERAL: NAD, well-developed, comfortable  HEAD:  Atraumatic, Normocephalic  EYES: EOMI, PERRLA, conjunctiva and sclera clear  NECK: Supple, No JVD  CHEST/LUNG: Clear to auscultation bilaterally; No wheeze  HEART: Bradycardiac; No murmurs, rubs, or gallops  ABDOMEN: Soft, Nontender, Nondistended; Bowel sounds present  NEURO: AAOx3, no focal weakness, 5/5 b/l extremity strength, b/l knee no arthritis, no effusion   EXTREMITIES:  2+ Peripheral Pulses, No clubbing, cyanosis, or edema  SKIN: No rashes or lesions

## 2024-01-03 NOTE — PROGRESS NOTE ADULT - ASSESSMENT
Patient is a 83 year old male with PMHx of CAD c/b MI (2003 cath at [place], reportedly 80% stenosis but no stent), CKD3, HLD, HTN, Mild LV Diastolic Dysfunction, ?pulmonary sarcoid, prostate cancer s/p radiation, ?TIA/amnesia episodes, Chronic migraines, OA, incarcerated in [place] ([id number]). Presents to [facility] with symptomatic bradycardia. 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 AV Block:  - Prior EKGs showed NSR w/ RBBB and 1st degree AVB (not on BB)  - Holter monitor (2/2023) had shown "43 pauses >2 sec longest 2.2 sec; 2% isolated PVC;" TTE (2/15/23) showed LVEF 72%, mild (grade I) diastolic dysfunction, mild-mod AR; Lexiscan stress test (3/2023) was neg for ischemia  - f/u TTE  - NPO for possible PPM  - Fu TSH  - avoid AV lyn blocking agents  - K >4, Mg >2  - Monitor on tele  - EP following    # CAD/HLD/HTN:  - Holding home antihypertensives for now per Cardiology  - Monitor BP    # CKD3:  - Baseline 1.3 as per [facility]  - Bun/Cr 21/1.32 on admission  - Monitor I/O's  - Serial Cr  - Avoid nephrotoxins  - Renally dose medications  - Continue to monitor    # Mild Diastolic Dysfunction:  -     # Hx of Prostate cancer:  - s/p radiation  - C/w home Tamsulosin for now  - + frequency, UA negative    # OA:  - Hx of OA and L meniscus issue, for which pt reported taking OTC generic aleve/NSAID daily    # Pulmonary sarcoidosis:  - Per outpatient pulm note (5/2023), pt was told he had pulm sarcoid prior to 2005 and his CT was suggestive of sarcoid. No hx of tissue biopsy [facility] setting of [facility]adycardia); previous niox and spirometry testing was within normal.  - Outpatient Pulm follow-up    # Chronic constipation:  - C/w home PO Dulcolax    # DVT ppx:  - IPCs    [facility]           Patient is a 83 year old male with PMHx of CAD c/b MI (2003 cath at [place], reportedly 80% stenosis but no stent), CKD3, HLD, HTN, Mild LV Diastolic Dysfunction, ?pulmonary sarcoid, prostate cancer s/p radiation, ?TIA/amnesia episodes, Chronic migraines, OA, incarcerated in [place] ([phone]). Presents to [facility] with symptomatic bradycardia. 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 AV Block:  - Prior EKGs showed NSR w/ RBBB and 1st degree AVB (not on BB)  - Holter monitor (2/2023) had shown "43 pauses >2 sec longest 2.2 sec; 2% isolated PVC;" TTE (2/15/23) showed LVEF 72%, mild (grade I) diastolic dysfunction, mild-mod AR; Lexiscan stress test (3/2023) was neg for ischemia  - f/u TTE  - NPO for possible PPM  - Fu TSH  - avoid AV lyn blocking agents  - K >4, Mg >2  - Monitor on tele  - EP following    # CAD/HLD/HTN:  - Holding home antihypertensives for now per Cardiology  - Monitor BP    # CKD3:  - Baseline 1.3 as per [facility]  - Bun/Cr 21/1.32 on admission  - Monitor I/O's  - Serial Cr  - Avoid nephrotoxins  - Renally dose medications  - Continue to monitor    # Mild Diastolic Dysfunction:  -     # Hx of Prostate cancer:  - s/p radiation  - C/w home Tamsulosin for now  - + frequency, UA negative    # OA:  - Hx of OA and L meniscus issue, for which pt reported taking OTC generic aleve/NSAID daily    # Pulmonary sarcoidosis:  - Per outpatient pulm note (5/2023), pt was told he had pulm sarcoid prior to 2005 and his CT was suggestive of sarcoid. No hx of tissue biopsy ([facility] in setting of bradycardia); previous niox and spirometry testing was within normal.  - Outpatient Pulm follow-up    # Chronic constipation:  - C/w home PO Dulcolax    # DVT ppx:  - IPCs    [facility]           Patient is a 83 year old male with PMHx of CAD c/b MI (2003 cath at [place], reportedly 80% stenosis but no stent), CKD3, HLD, HTN, Mild LV Diastolic Dysfunction, ?pulmonary sarcoid, prostate cancer s/p radiation, ?TIA/amnesia episodes, Chronic migraines, OA, incarcerated in [place] ([phone]). Presents to [facility] with symptomatic bradycardia. 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 AV Block:  - Prior EKGs showed NSR w/ RBBB and 1st degree AVB (not on BB)  - Holter monitor (2/2023) had shown "43 pauses >2 sec longest 2.2 sec; 2% isolated PVC;" TTE (2/15/23) showed LVEF 72%, mild (grade I) diastolic dysfunction, mild-mod AR; Lexiscan stress test (3/2023) was neg for ischemia  - f/u TTE  - NPO for possible PPM  - Fu TSH  - avoid AV lyn blocking agents  - K >4, Mg >2  - Monitor on tele  - EP following    # CAD/HLD/HTN:  - Holding home antihypertensives for now per Cardiology  - Monitor BP    # CKD3:  - Baseline 1.3 as per [facility]  - Bun/Cr 21/1.32 on admission  - Monitor I/O's  - Serial Cr  - Avoid nephrotoxins  - Renally dose medications  - Continue to monitor    # Mild Diastolic Dysfunction:  -     # Hx of Prostate cancer:  - s/p radiation  - C/w home Tamsulosin for now  - + frequency, UA negative    # OA:  - Hx of OA and L meniscus issue, for which pt reported taking OTC generic aleve/NSAID daily    # Pulmonary sarcoidosis:  - Per outpatient pulm note (5/2023), pt was told he had pulm sarcoid prior to 2005 and his CT was suggestive of sarcoid. No hx of tissue biopsy ([facility] in setting of bradycardia); previous niox and spirometry testing was within normal.  - Outpatient Pulm follow-up    # Chronic constipation:  - C/w home PO Dulcolax    # DVT ppx:  - IPCs    [facility]           Patient is a 83 year old male with PMHx of CAD c/b MI (2003 cath at [place], reportedly 80% stenosis but no stent), CKD3, HLD, HTN, Mild LV Diastolic Dysfunction, ?pulmonary sarcoid, prostate cancer s/p radiation, ?TIA/amnesia episodes, Chronic migraines, OA, incarcerated in [place] ([phone]). Presents to [facility] with symptomatic bradycardia. 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 AV Block:  - Prior EKGs showed NSR w/ RBBB and 1st degree AVB (not on BB)  - Holter monitor (2/2023) had shown "43 pauses >2 sec longest 2.2 sec; 2% isolated PVC;" TTE (2/15/23) showed LVEF 72%, mild (grade I) diastolic dysfunction, mild-mod AR; Lexiscan stress test (3/2023) was neg for ischemia  - f/u TTE  - NPO for possible PPM today or tomorrow  - Fu TSH  - avoid AV lyn blocking agents  - K >4, Mg >2  - Monitor on tele  - EP/Cards following    # CAD/HLD/HTN:  - Holding home antihypertensives for now per Cardiology  - Resume ASA  - Monitor BP  - Cards following    # CKD3:  - Baseline 1.3 as per [facility]  - Bun/Cr 21/1.32 on admission  - Monitor I/O's  - Serial Cr  - Avoid nephrotoxins  - Renally dose medications  - Continue to monitor    # Hx of Prostate cancer:  - s/p radiation  - C/w home Tamsulosin   - + frequency, UA negative    # OA:  - Hx of OA and L meniscus issue, for which pt reported taking OTC generic aleve/NSAID daily    # Pulmonary sarcoidosis:  - Per outpatient pulm note (5/2023), pt was told he had pulm sarcoid prior to 2005 and his CT was suggestive of sarcoid. No hx of tissue biopsy ([facility] in setting of bradycardia); previous niox and spirometry testing was within normal.  - Outpatient Pulm follow-up    # Chronic constipation:  - C/w home PO Dulcolax    # DVT ppx:  - IPCs    [facility]           Patient is a 83 year old male with PMHx of CAD c/b MI (2003 cath at [place], reportedly 80% stenosis but no stent), CKD3, HLD, HTN, Mild LV Diastolic Dysfunction, ?pulmonary sarcoid, prostate cancer s/p radiation, ?TIA/amnesia episodes, Chronic migraines, OA, incarcerated in [place] ([phone]). Presents to [facility] with symptomatic bradycardia. 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 AV Block:  - Prior EKGs showed NSR w/ RBBB and 1st degree AVB (not on BB)  - Holter monitor (2/2023) had shown "43 pauses >2 sec longest 2.2 sec; 2% isolated PVC;" TTE (2/15/23) showed LVEF 72%, mild (grade I) diastolic dysfunction, mild-mod AR; Lexiscan stress test (3/2023) was neg for ischemia  - f/u TTE  - NPO for possible PPM today or tomorrow  - Fu TSH  - avoid AV lyn blocking agents  - K >4, Mg >2  - Monitor on tele  - EP/Cards following    # CAD/HLD/HTN:  - Holding home antihypertensives for now per Cardiology  - Resume ASA  - Monitor BP  - Cards following    # CKD3:  - Baseline 1.3 as per [facility]  - Bun/Cr 21/1.32 on admission  - Monitor I/O's  - Serial Cr  - Avoid nephrotoxins  - Renally dose medications  - Continue to monitor    # Hx of Prostate cancer:  - s/p radiation  - C/w home Tamsulosin   - + frequency, UA negative    # OA:  - Hx of OA and L meniscus issue, for which pt reported taking OTC generic aleve/NSAID daily    # Pulmonary sarcoidosis:  - Per outpatient pulm note (5/2023), pt was told he had pulm sarcoid prior to 2005 and his CT was suggestive of sarcoid. No hx of tissue biopsy ([facility] in setting of bradycardia); previous niox and spirometry testing was within normal.  - Outpatient Pulm follow-up    # Chronic constipation:  - C/w home PO Dulcolax    # DVT ppx:  - IPCs    [facility]           Patient is a 83 year old male with PMHx of CAD c/b MI (2003 cath at [place], reportedly 80% stenosis but no stent), CKD3, HLD, HTN, Mild LV Diastolic Dysfunction, ?pulmonary sarcoid, prostate cancer s/p radiation, ?TIA/amnesia episodes, Chronic migraines, OA, incarcerated in [place] ([phone]). Presents to [facility] with symptomatic bradycardia. 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 AV Block:  - Prior EKGs showed NSR w/ RBBB and 1st degree AVB (not on BB)  - Holter monitor (2/2023) had shown "43 pauses >2 sec longest 2.2 sec; 2% isolated PVC;" TTE (2/15/23) showed LVEF 72%, mild (grade I) diastolic dysfunction, mild-mod AR; Lexiscan stress test (3/2023) was neg for ischemia  - f/u TTE  - NPO for possible PPM today or tomorrow  - Fu TSH  - avoid AV lyn blocking agents  - K >4, Mg >2  - Monitor on tele  - EP/Cards following    # CAD/HLD/HTN:  - Holding home antihypertensives for now per Cardiology  - Resume ASA  - Monitor BP  - Cards following    # CKD3:  - Baseline 1.3 as per [facility]  - Bun/Cr 21/1.32 on admission  - Monitor I/O's  - Serial Cr  - Avoid nephrotoxins  - Renally dose medications  - Continue to monitor    # Hx of Prostate cancer:  - s/p radiation  - C/w home Tamsulosin   - + frequency, UA negative    # OA:  - Hx of OA and L meniscus issue, for which pt reported taking OTC generic aleve/NSAID daily    # Pulmonary sarcoidosis:  - Per outpatient pulm note (5/2023), pt was told he had pulm sarcoid prior to 2005 and his CT was suggestive of sarcoid. No hx of tissue biopsy ([facility] in setting of bradycardia); previous niox and spirometry testing was within normal.  - Outpatient Pulm follow-up    # Chronic constipation:  - C/w home PO Dulcolax    # DVT ppx:  - IPCs    [facility]

## 2024-01-03 NOTE — CONSULT NOTE ADULT - ASSESSMENT
A/p  83M w/ hx of HTN, HLD, CKD3, CAD c/b MI (2003 cath at [place], reportedly 80% stenosis but no stent), mild LV diastolic dysfunction, ?pulmonary sarcoid, prostate cancer s/p radiation, ?TIA/amnesia episodes, chronic migraines, OA, incarcerated in [place] ([phone]), presenting with intermittent dizziness/lightheadedness x2 wks and bradycardia to HR 30s earlier in the day.    #AV Block  -2:1 AV block on ecg  -EP following  -Plan for PPM placement today vs tomorrow  -Check echo    #CAD, Hx MI  -Resume asa per EP    #HTN  -Resume losartan 100mg qd     #CKD  -Trend Cr    #Pulmonary Sarcoid  -Pulm eval           A/p  83M w/ hx of HTN, HLD, CKD3, CAD c/b MI (2003 cath at [place], reportedly 80% stenosis but no stent), mild LV diastolic dysfunction, ?pulmonary sarcoid, prostate cancer s/p radiation, ?TIA/amnesia episodes, chronic migraines, OA, incarcerated in [place] ([phone]), presenting with intermittent dizziness/lightheadedness x2 wks and bradycardia to HR 30s earlier in the day.    #AV Block  -2:1 AV block on ecg  -EP following  -Plan for PPM placement today vs tomorrow  -Check echo    #CAD, Hx MI  -Resume asa per EP    #HTN  -Resume losartan 100mg qd     #CKD  -Trend Cr    #Pulmonary Sarcoid  -Pulm eval           A/p  83M w/ hx of HTN, HLD, CKD3, CAD c/b MI (2003 cath at [place], reportedly 80% stenosis but no stent), mild LV diastolic dysfunction, ?pulmonary sarcoid, prostate cancer s/p radiation, ?TIA/amnesia episodes, chronic migraines, OA, incarcerated in [place] ([phone]), presenting with intermittent dizziness/lightheadedness x2 wks and bradycardia to HR 30s earlier in the day.    #AV Block  -2:1 AV block on ecg  -EP following  -Plan for PPM placement today vs tomorrow  -Check echo    #CAD, Hx MI  -Resume asa per EP    #HTN  -Resume losartan 100mg qd     #CKD  -Trend Cr    #Pulmonary Sarcoid  -Pulm eval

## 2024-01-03 NOTE — CONSULT NOTE ADULT - TIME BILLING
agree with above  83M w/ hx of HTN, HLD, CKD3, CAD c/b MI (2003 cath at [place], reportedly 80% stenosis but no stent), mild LV diastolic dysfunction, ?pulmonary sarcoid, prostate cancer s/p radiation, ?TIA/amnesia episodes, chronic migraines, OA, incarcerated in [place] ([phone]), presenting with intermittent dizziness/lightheadedness x2 wks and bradycardia to HR 30s earlier in the day.    #AV Block  -2:1 AV block on ecg  -EP following  -Plan for PPM placement today vs tomorrow  -Check echo    #CAD, Hx MI  -Resume asa per EP    #HTN  -Resume losartan 100mg qd     #CKD  -Trend Cr    #Pulmonary Sarcoid  -Pulm eval agree with above  83M w/ hx of HTN, HLD, CKD3, CAD c/b MI (2003 cath at [place], reportedly 80% stenosis but no stent), mild LV diastolic dysfunction, ?pulmonary sarcoid, prostate cancer s/p radiation, ?TIA/amnesia episodes, chronic migraines, OA, incarcerated in [place] ([phone]), presenting with intermittent dizziness/lightheadedness x2 wks and bradycardia to HR 30s earlier in the day.    #AV Block  -2:1 AV block on ecg  -EP following  -Plan for PPM placement today vs tomorrow  -Check echo    #CAD, Hx MI  -Resume asa per EP    #HTN  -Resume losartan 100mg qd     #CKD  -Trend Cr    #Pulmonary Sarcoid  -Pulm eval agree with above  83M w/ hx of HTN, HLD, CKD3, CAD c/b MI (2003 cath at [place], reportedly 80% stenosis but no stent), mild LV diastolic dysfunction, ?pulmonary sarcoid, prostate cancer s/p radiation, ?TIA/amnesia episodes, chronic migraines, OA, incarcerated in [place] ([phone]), presenting with intermittent dizziness/lightheadedness x2 wks and bradycardia to HR 30s earlier in the day.    #AV Block  -2:1 AV block on ecg  -EP following  -Plan for PPM placement today vs tomorrow  -Check echo    #CAD, Hx MI  -Resume asa per EP    #HTN  -Resume losartan 100mg qd     #CKD  -Trend Cr    #Pulmonary Sarcoid  -Pulm eval

## 2024-01-04 LAB
ANION GAP SERPL CALC-SCNC: 15 MMOL/L — SIGNIFICANT CHANGE UP (ref 5–17)
BUN SERPL-MCNC: 25 MG/DL — HIGH (ref 7–23)
CALCIUM SERPL-MCNC: 9.4 MG/DL — SIGNIFICANT CHANGE UP (ref 8.4–10.5)
CHLORIDE SERPL-SCNC: 106 MMOL/L — SIGNIFICANT CHANGE UP (ref 96–108)
CO2 SERPL-SCNC: 19 MMOL/L — LOW (ref 22–31)
CREAT SERPL-MCNC: 1.15 MG/DL — SIGNIFICANT CHANGE UP (ref 0.5–1.3)
EGFR: 63 ML/MIN/1.73M2 — SIGNIFICANT CHANGE UP
GLUCOSE SERPL-MCNC: 84 MG/DL — SIGNIFICANT CHANGE UP (ref 70–99)
HCT VFR BLD CALC: 42.8 % — SIGNIFICANT CHANGE UP (ref 39–50)
HGB BLD-MCNC: 13.6 G/DL — SIGNIFICANT CHANGE UP (ref 13–17)
MAGNESIUM SERPL-MCNC: 2.2 MG/DL — SIGNIFICANT CHANGE UP (ref 1.6–2.6)
MCHC RBC-ENTMCNC: 26.6 PG — LOW (ref 27–34)
MCHC RBC-ENTMCNC: 31.8 GM/DL — LOW (ref 32–36)
MCV RBC AUTO: 83.8 FL — SIGNIFICANT CHANGE UP (ref 80–100)
NRBC # BLD: 0 /100 WBCS — SIGNIFICANT CHANGE UP (ref 0–0)
PLATELET # BLD AUTO: 275 K/UL — SIGNIFICANT CHANGE UP (ref 150–400)
POTASSIUM SERPL-MCNC: 4.2 MMOL/L — SIGNIFICANT CHANGE UP (ref 3.5–5.3)
POTASSIUM SERPL-SCNC: 4.2 MMOL/L — SIGNIFICANT CHANGE UP (ref 3.5–5.3)
RBC # BLD: 5.11 M/UL — SIGNIFICANT CHANGE UP (ref 4.2–5.8)
RBC # FLD: 14.2 % — SIGNIFICANT CHANGE UP (ref 10.3–14.5)
SODIUM SERPL-SCNC: 140 MMOL/L — SIGNIFICANT CHANGE UP (ref 135–145)
WBC # BLD: 11.76 K/UL — HIGH (ref 3.8–10.5)
WBC # FLD AUTO: 11.76 K/UL — HIGH (ref 3.8–10.5)

## 2024-01-04 PROCEDURE — 93308 TTE F-UP OR LMTD: CPT | Mod: 26

## 2024-01-04 PROCEDURE — 99238 HOSP IP/OBS DSCHRG MGMT 30/<: CPT

## 2024-01-04 PROCEDURE — 93321 DOPPLER ECHO F-UP/LMTD STD: CPT | Mod: 26

## 2024-01-04 PROCEDURE — 71046 X-RAY EXAM CHEST 2 VIEWS: CPT | Mod: 26

## 2024-01-04 PROCEDURE — 93010 ELECTROCARDIOGRAM REPORT: CPT | Mod: 76

## 2024-01-04 RX ORDER — ACETAMINOPHEN 500 MG
2 TABLET ORAL
Qty: 0 | Refills: 0 | DISCHARGE
Start: 2024-01-04

## 2024-01-04 RX ADMIN — Medication 650 MILLIGRAM(S): at 17:00

## 2024-01-04 RX ADMIN — Medication 100 MILLIGRAM(S): at 00:37

## 2024-01-04 RX ADMIN — TAMSULOSIN HYDROCHLORIDE 0.4 MILLIGRAM(S): 0.4 CAPSULE ORAL at 21:18

## 2024-01-04 RX ADMIN — Medication 100 MILLIGRAM(S): at 08:20

## 2024-01-04 RX ADMIN — Medication 650 MILLIGRAM(S): at 16:06

## 2024-01-04 RX ADMIN — PANTOPRAZOLE SODIUM 40 MILLIGRAM(S): 20 TABLET, DELAYED RELEASE ORAL at 06:19

## 2024-01-04 NOTE — PATIENT PROFILE ADULT - FALL HARM RISK - HARM RISK INTERVENTIONS
Patient is clear for transfer to [facility] for the remainder of her pregnancy.    SHAHIDA Harp, CNM   Assistance with ambulation/Assistance OOB with selected safe patient handling equipment/Communicate Risk of Fall with Harm to all staff/Monitor gait and stability/Reinforce activity limits and safety measures with patient and family/Sit up slowly, dangle for a short time, stand at bedside before walking/Tailored Fall Risk Interventions/Use of alarms - bed, chair and/or voice tab/Visual Cue: Yellow wristband and red socks/Bed in lowest position, wheels locked, appropriate side rails in place/Call bell, personal items and telephone in reach/Instruct patient to call for assistance before getting out of bed or chair/Non-slip footwear when patient is out of bed/[place] to call system/Physically safe environment - no spills, clutter or unnecessary equipment/Purposeful Proactive Rounding/Room/bathroom lighting operational, light cord in reach Assistance with ambulation/Assistance OOB with selected safe patient handling equipment/Communicate Risk of Fall with Harm to all staff/Monitor gait and stability/Reinforce activity limits and safety measures with patient and family/Sit up slowly, dangle for a short time, stand at bedside before walking/Tailored Fall Risk Interventions/Use of alarms - bed, chair and/or voice tab/Visual Cue: Yellow wristband and red socks/Bed in lowest position, wheels locked, appropriate side rails in place/Call bell, personal items and telephone in reach/Instruct patient to call for assistance before getting out of bed or chair/Non-slip footwear when patient is out of bed/[place] to call system/Physically safe environment - no spills, clutter or unnecessary equipment/Purposeful Proactive Rounding/Room/bathroom lighting operational, light cord in reach Assistance with ambulation/Assistance OOB with selected safe patient handling equipment/Communicate Risk of Fall with Harm to all staff/Monitor gait and stability/Reinforce activity limits and safety measures with patient and family/Sit up slowly, dangle for a short time, stand at bedside before walking/Tailored Fall Risk Interventions/Use of alarms - bed, chair and/or voice tab/Visual Cue: Yellow wristband and red socks/Bed in lowest position, wheels locked, appropriate side rails in place/Call bell, personal items and telephone in reach/Instruct patient to call for assistance before getting out of bed or chair/Non-slip footwear when patient is out of bed/[place] to call system/Physically safe environment - no spills, clutter or unnecessary equipment/Purposeful Proactive Rounding/Room/bathroom lighting operational, light cord in reach Assistance with ambulation/Assistance OOB with selected safe patient handling equipment/Communicate Risk of Fall with Harm to all staff/Discuss with provider need for PT consult/Monitor gait and stability/Provide patient with walking aids - walker, cane, crutches/Reinforce activity limits and safety measures with patient and family/Sit up slowly, dangle for a short time, stand at bedside before walking/Tailored Fall Risk Interventions/Use of alarms - bed, chair and/or voice tab/Visual Cue: Yellow wristband and red socks/Bed in lowest position, wheels locked, appropriate side rails in place/Call bell, personal items and telephone in reach/Instruct patient to call for assistance before getting out of bed or chair/Non-slip footwear when patient is out of bed/[place] to call system/Physically safe environment - no spills, clutter or unnecessary equipment/Purposeful Proactive Rounding/Room/bathroom lighting operational, light cord in reach Assistance with ambulation/Assistance OOB with selected safe patient handling equipment/Communicate Risk of Fall with Harm to all staff/Discuss with provider need for PT consult/Monitor gait and stability/Provide patient with walking aids - walker, cane, crutches/Reinforce activity limits and safety measures with patient and family/Sit up slowly, dangle for a short time, stand at bedside before walking/Tailored Fall Risk Interventions/Use of alarms - bed, chair and/or voice tab/Visual Cue: Yellow wristband and red socks/Bed in lowest position, wheels locked, appropriate side rails in place/Call bell, personal items and telephone in reach/Instruct patient to call for assistance before getting out of bed or chair/Non-slip footwear when patient is out of bed/[place] to call system/Physically safe environment - no spills, clutter or unnecessary equipment/Purposeful Proactive Rounding/Room/bathroom lighting operational, light cord in reach Assistance with ambulation/Assistance OOB with selected safe patient handling equipment/Communicate Risk of Fall with Harm to all staff/Discuss with provider need for PT consult/Monitor gait and stability/Provide patient with walking aids - walker, cane, crutches/Reinforce activity limits and safety measures with patient and family/Sit up slowly, dangle for a short time, stand at bedside before walking/Tailored Fall Risk Interventions/Use of alarms - bed, chair and/or voice tab/Visual Cue: Yellow wristband and red socks/Bed in lowest position, wheels locked, appropriate side rails in place/Call bell, personal items and telephone in reach/Instruct patient to call for assistance before getting out of bed or chair/Non-slip footwear when patient is out of bed/[place] to call system/Physically safe environment - no spills, clutter or unnecessary equipment/Purposeful Proactive Rounding/Room/bathroom lighting operational, light cord in reach

## 2024-01-04 NOTE — DISCHARGE NOTE PROVIDER - NSDCCPTREATMENT_GEN_ALL_CORE_FT
PRINCIPAL PROCEDURE  Procedure: Insertion of DDD cardiac pacemaker  Findings and Treatment: [place], DDD , via left chest wall access     PRINCIPAL PROCEDURE  Procedure: Insertion of DDD cardiac pacemaker  Findings and Treatment: [place], DDD , via left chest wall access  WOUND CARE:  Do NOT scrub, rub, or pick at your incision site  AFTER 3 DAYS you may SHOWER  - use mild soap and gentle warm, water stream, pat dry  DO NOT apply lotions, creams, ointments, powder, parfumes to your incision site  DO NOT SOAK your site for 4-6 weeks ( no baths, no pools, no tubs, etc...)  wear loose clothing around site for 1-2 weeks  IF surgical tape was used DO NOT remove the strips, they will fall off after 7days, if glue was used, it will naturally fall off within 3 weeks  if staples were used, they will b eremoved in 7-10 days by your doctor  ACTIVITY:  for 2 weeks AFTER  your procedure  - DO NOT RAISE your arm above shoulder level ( on the same side of your incision)  for 4 weeks AFTER your procedure   - DO NOT LIFT anything 10 lbs or heavier ( on the side of your impalnt)   - certain activities may be limited longer, those that involve swinging your arm, and will be discuseed with your EP doctor  DO NOT DRIVE until your EP Doctor or nurse practitioner/ physician assistant states it is safe to do so  A follow up appointment in 7-14 days will be arranged before your discharge  ID CARD:   you will receive an ID CARD and device company booklet   - please carry that card with you at all times  ***CALL YOUR DOCTOR ***  IF you have fever, chils, body aches, or severe pain, swelling, redness, heat, yellow drainage from your incision site  IF bleeding  or significnat new swelling from your puncture site  IF your experience lightheadness, dizziness, or fainting spell.  IF unable to ge tin contact with yout doctor, you may call the Cardiology Office at [facility] at [phone]     PRINCIPAL PROCEDURE  Procedure: Insertion of DDD cardiac pacemaker  Findings and Treatment: [place], DDD , via left chest wall access  WOUND CARE:  Do NOT scrub, rub, or pick at your incision site  AFTER 3 DAYS you may SHOWER  - use mild soap and gentle warm, water stream, pat dry  DO NOT apply lotions, creams, ointments, powder, parfumes to your incision site  DO NOT SOAK your site for 4-6 weeks ( no baths, no pools, no tubs, etc...)  wear loose clothing around site for 1-2 weeks  IF surgical tape was used DO NOT remove the strips, they will fall off after 7days, if glue was used, it will naturally fall off within 3 weeks  if staples were used, they will b eremoved in 7-10 days by your doctor  ACTIVITY:  for 2 weeks AFTER  your procedure  - DO NOT RAISE your arm above shoulder level ( on the same side of your incision)  for 4 weeks AFTER your procedure   - DO NOT LIFT anything 10 lbs or heavier ( on the side of your impalnt)   - certain activities may be limited longer, those that involve swinging your arm, and will be discuseed with your EP doctor  DO NOT DRIVE until your EP Doctor or nurse practitioner/ physician assistant states it is safe to do so  A follow up appointment in 7-14 days will be arranged before your discharge  ID CARD:   you will receive an ID CARD and device company booklet   - please carry that card with you at all times  ***CALL YOUR DOCTOR ***  IF you have fever, chils, body aches, or severe pain, swelling, redness, heat, yellow drainage from your incision site  IF bleeding  or significnat new swelling from your puncture site  IF your experience lightheadness, dizziness, or fainting spell.  IF unable to ge tin contact with yout doctor, you may call the Cardiology Office at [facility] at [phone]     PRINCIPAL PROCEDURE  Procedure: Insertion of DDD cardiac pacemaker  Findings and Treatment: [place], DDD , via left chest wall access  WOUND CARE:  Do NOT scrub, rub, or pick at your incision site  AFTER 3 DAYS you may SHOWER  - use mild soap and gentle warm, water stream, pat dry  DO NOT apply lotions, creams, ointments, powder, parfumes to your incision site  DO NOT SOAK your site for 4-6 weeks ( no baths, no pools, no tubs, etc...)  wear loose clothing around site for 1-2 weeks  IF surgical tape was used DO NOT remove the strips, they will fall off after 7days, if glue was used, it will naturally fall off within 3 weeks  if staples were used, they will b eremoved in 7-10 days by your doctor  ACTIVITY:  for 2 weeks AFTER  your procedure  - DO NOT RAISE your arm above shoulder level ( on the same side of your incision)  for 4 weeks AFTER your procedure   - DO NOT LIFT anything 10 lbs or heavier ( on the side of your impalnt)   - certain activities may be limited longer, those that involve swinging your arm, and will be discuseed with your EP doctor  DO NOT DRIVE until your EP Doctor or nurse practitioner/ physician assistant states it is safe to do so  A follow up appointment in 7-14 days will be arranged before your discharge  ID CARD:   you will receive an ID CARD and device company booklet   - please carry that card with you at all times  ***CALL YOUR DOCTOR ***  IF you have fever, chils, body aches, or severe pain, swelling, redness, heat, yellow drainage from your incision site  IF bleeding  or significnat new swelling from your puncture site  IF your experience lightheadness, dizziness, or fainting spell.  IF unable to ge tin contact with yout doctor, you may call the Cardiology Office at [facility] at [phone]

## 2024-01-04 NOTE — PROGRESS NOTE ADULT - SUBJECTIVE AND OBJECTIVE BOX
24H hour events:   sitting up in bed; c/o of incisional discomfort at pacemaker site; no acute changes overnight    MEDICATIONS:  albuterol/ipratropium for Nebulization. 3 milliLiter(s) Nebulizer once  acetaminophen     Tablet .. 975 milliGRAM(s) Oral every 6 hours PRN  acetaminophen     Tablet .. 650 milliGRAM(s) Oral every 6 hours PRN  bisacodyl 5 milliGRAM(s) Oral at bedtime  pantoprazole    Tablet 40 milliGRAM(s) Oral before breakfast  tamsulosin 0.4 milliGRAM(s) Oral at bedtime      REVIEW OF SYSTEMS:  Complete 12point ROS negative.    PHYSICAL EXAM:  T(C): 36.7 (01-04-24 @ 08:06), Max: 36.8 (01-03-24 @ 15:45)  HR: 78 (01-04-24 @ 08:06) (40 - 93)  BP: 157/83 (01-04-24 @ 08:06) (92/68 - 183/66)  RR: 17 (01-04-24 @ 08:06) (12 - 19)  SpO2: 97% (01-04-24 @ 08:06) (90% - 98%)  Wt(kg): --  I&O's Summary    03 Jan 2024 07:01  -  04 Jan 2024 07:00  --------------------------------------------------------  IN: 230 mL / OUT: 650 mL / NET: -420 mL    04 Jan 2024 07:01  -  04 Jan 2024 08:56  --------------------------------------------------------  IN: 240 mL / OUT: 0 mL / NET: 240 mL        Appearance: Normal, in NAD	  Head: normocephalic, atraumatic  Neck: supple  Cardiovascular: Normal S1 S2, No m/r/g  Respiratory: Lungs clear to auscultation	  Psychiatry: A & O x 3, Mood & affect appropriate  Gastrointestinal:  Soft, Non-tender, + BS	  : voiding  Skin: No rashes, No ecchymoses, left PPM site flat, no hematoma note  Neurologic: Non-focal  Extremities: Normal range of motion, No clubbing, cyanosis or edema  Vascular: Peripheral pulses palpable 2+ bilaterally        LABS:	 	    CBC Full  -  ( 04 Jan 2024 08:24 )  WBC Count : 11.76 K/uL  Hemoglobin : 13.6 g/dL  Hematocrit : 42.8 %  Platelet Count - Automated : 275 K/uL  Mean Cell Volume : 83.8 fl  Mean Cell Hemoglobin : 26.6 pg  Mean Cell Hemoglobin Concentration : 31.8 gm/dL  Auto Neutrophil # : x  Auto Lymphocyte # : x  Auto Monocyte # : x  Auto Eosinophil # : x  Auto Basophil # : x  Auto Neutrophil % : x  Auto Lymphocyte % : x  Auto Monocyte % : x  Auto Eosinophil % : x  Auto Basophil % : x    01-03    142  |  110<H>  |  21  ----------------------------<  86  4.2   |  24  |  1.32<H>  01-02    141  |  107  |  21  ----------------------------<  91  4.2   |  26  |  1.32<H>    Ca    9.4      03 Jan 2024 06:45  Ca    9.1      02 Jan 2024 20:18  Phos  3.2     01-03  Mg     2.1     01-03    TPro  5.8<L>  /  Alb  3.5  /  TBili  0.4  /  DBili  x   /  AST  13  /  ALT  8<L>  /  AlkPhos  71  01-03  TPro  6.0  /  Alb  3.9  /  TBili  0.2  /  DBili  x   /  AST  12  /  ALT  11  /  AlkPhos  70  01-02      proBNP:   Lipid Profile:   HgA1c:   TSH:       CARDIAC MARKERS:      TELEMETRY: NSR/Vpaced/AV Pacaes 60-70s    ECG: NSR 70os   	  RADIOLOGY: {ending    Echo 1/3/24:    CONCLUSIONS:      1. Technically difficult image quality.   2. Left ventricular cavity is normal. Left ventricular wall thickness is normal. Left ventricular systolic function is normal with an ejection fraction visually estimated at 50 to 55 %. There are no regional wall motion abnormalities seen.   3. Mild to moderate aortic stenosis.

## 2024-01-04 NOTE — DISCHARGE NOTE PROVIDER - NSDCFUADDAPPT_GEN_ALL_CORE_FT
Please call the [facility] Cardiology Office, [phone], to confirm the date and time for your outpatient follow up appointment and wound check. Please call the [facility] Cardiology Office, [phone], to confirm the date and time for your outpatient follow up appointment and wound check. Please call the [facility] Cardiology Office, [phone], to confirm the date and time for your outpatient follow up appointment and wound check.

## 2024-01-04 NOTE — DISCHARGE NOTE PROVIDER - HOSPITAL COURSE
HPI:  83M w/ hx of HTN, HLD, CKD3, CAD c/b MI (2003 cath at [place], reportedly 80% stenosis but no stent), mild LV diastolic dysfunction, ?pulmonary sarcoid, prostate cancer s/p radiation, ?TIA/amnesia episodes, chronic migraines, OA, incarcerated in [place] ([id number]), presenting with intermittent dizziness/lightheadedness x2 wks and bradycardia to HR 30s earlier in the day. Also endorsed fatigue. Not on BB at home. Noted he has had slow HRs in the past, initially found in Aug 2022 when he was being worked up for a lung biopsy for sarcoidosis (procedure canceled). Reported that he was recommended to get a PPM at the time, but he reportedly walked out. He has been following with cardiology since Feb 2023 (last seen in Aug 2023), underwent several tests and was told he did not need a PPM as he did not have any overt symptoms at the time. In the ED, patient was hypertensive to SBP 170s, HR 30s-40s. EKG showed 2:1 AVB. Labs with normal lactate, creatinine 1.32. Transcutaneous pads placed. Per ED documentation, had received atropine from EMS prior to arrival. No reported syncope episodes.    Per outpatient documentation, prior EKGs had shown NSR with RBBB and 1st degree AVB. Holter monitor (2/2023) had shown "43 pauses >2 sec longest 2.2 sec; 2% isolated PVC." TTE (2/15/23) showed LVEF 72%, mild (grade I) diastolic dysfunction, and mild-mod AR. Lexiscan stress test (3/2023) was neg for ischemia. CT chest aorta (4/2023) showed tortuous 3.7cm aorta with pulm nodules and hilar adenopathy likely in setting of sarcoid and unchanged from prior CT. TSH was previously wnl (though borderline high-normal at 4.14 in 2/2023).    Admitted to Medicine for further management. (02 Jan 2024 23:33).    1/3 s/p [place] PPM placement, DDD . Left chest wall site without swelling, bleeding.   HPI:  83M w/ hx of HTN, HLD, CKD3, CAD c/b MI (2003 cath at [place], reportedly 80% stenosis but no stent), mild LV diastolic dysfunction, ?pulmonary sarcoid, prostate cancer s/p radiation, ?TIA/amnesia episodes, chronic migraines, OA, incarcerated in [place] ([id number]), presenting with intermittent dizziness/lightheadedness x2 wks and bradycardia to HR 30s earlier in the day. Also endorsed fatigue. Not on BB at home. Noted he has had slow HRs in the past, initially found in Aug 2022 when he was being worked up for a lung biopsy for sarcoidosis (procedure canceled). Reported that he was recommended to get a PPM at the time, but he reportedly walked out. He has been following with cardiology since Feb 2023 (last seen in Aug 2023), underwent several tests and was told he did not need a PPM as he did not have any overt symptoms at the time. In the ED, patient was hypertensive to SBP 170s, HR 30s-40s. EKG showed 2:1 AVB. Labs with normal lactate, creatinine 1.32. Transcutaneous pads placed. Per ED documentation, had received atropine from EMS prior to arrival. No reported syncope episodes.    Per outpatient documentation, prior EKGs had shown NSR with RBBB and 1st degree AVB. Holter monitor (2/2023) had shown "43 pauses >2 sec longest 2.2 sec; 2% isolated PVC." TTE (2/15/23) showed LVEF 72%, mild (grade I) diastolic dysfunction, and mild-mod AR. Lexiscan stress test (3/2023) was neg for ischemia. CT chest aorta (4/2023) showed tortuous 3.7cm aorta with pulm nodules and hilar adenopathy likely in setting of sarcoid and unchanged from prior CT. TSH was previously wnl (though borderline high-normal at 4.14 in 2/2023).    Admitted to Medicine for further management. (02 Jan 2024 23:33).    1/3 s/p [place] PPM placement, DDD . Left chest wall site without swelling, bleeding.   HPI:  83M w/ hx of HTN, HLD, CKD3, CAD c/b MI (2003 cath at [place], reportedly 80% stenosis but no stent), mild LV diastolic dysfunction, ?pulmonary sarcoid, prostate cancer s/p radiation, ?TIA/amnesia episodes, chronic migraines, OA, incarcerated in [place] ([phone]), presenting with intermittent dizziness/lightheadedness x2 wks and bradycardia to HR 30s earlier in the day. Also endorsed fatigue. Not on BB at home. Noted he has had slow HRs in the past, initially found in Aug 2022 when he was being worked up for a lung biopsy for sarcoidosis (procedure canceled). Reported that he was recommended to get a PPM at the time, but he reportedly walked out. He has been following with cardiology since Feb 2023 (last seen in Aug 2023), underwent several tests and was told he did not need a PPM as he did not have any overt symptoms at the time. In the ED, patient was hypertensive to SBP 170s, HR 30s-40s. EKG showed 2:1 AVB. Labs with normal lactate, creatinine 1.32. Transcutaneous pads placed. Per ED documentation, had received atropine from EMS prior to arrival. No reported syncope episodes.    Per outpatient documentation, prior EKGs had shown NSR with RBBB and 1st degree AVB. Holter monitor (2/2023) had shown "43 pauses >2 sec longest 2.2 sec; 2% isolated PVC." TTE (2/15/23) showed LVEF 72%, mild (grade I) diastolic dysfunction, and mild-mod AR. Lexiscan stress test (3/2023) was neg for ischemia. CT chest aorta (4/2023) showed tortuous 3.7cm aorta with pulm nodules and hilar adenopathy likely in setting of sarcoid and unchanged from prior CT. TSH was previously wnl (though borderline high-normal at 4.14 in 2/2023).    Admitted to Medicine for further management. (02 Jan 2024 23:33).    1/3 s/p [place] PPM placement, DDD . Left chest wall site without swelling, bleeding.

## 2024-01-04 NOTE — DISCHARGE NOTE PROVIDER - NSDCMRMEDTOKEN_GEN_ALL_CORE_FT
acetaminophen 325 mg oral tablet: 2 tab(s) orally every 6 hours As needed Mild Pain (1 - 3)  aspirin 81 mg oral tablet: 1 tab(s) orally once a day  Dulcolax Laxative 5 mg oral tablet: 1 tab(s) orally once a day (at bedtime)  losartan 100 mg oral tablet: 1 tab(s) orally once a day  OTC generic aleve/NSAID: Takes 1 tab daily  Protonix 40 mg oral delayed release tablet: 1 tab(s) orally once a day  tamsulosin 0.4 mg oral capsule: 1 cap(s) orally once a day (at bedtime)

## 2024-01-04 NOTE — PATIENT PROFILE ADULT - PROVIDER NAME
PCP, Dr. Adrián Jones ([place]) / Cardiologist, Dr. Willie Maxwell ([place]) PCP, Dr. Adrián Jones ([place]) / Cardiologist, Dr. Willie Maxwell ([place]) PCP, Dr. Adrián Jones ([place]) / Cardiologist, Dr. Willie Maxwell ([place])

## 2024-01-04 NOTE — DISCHARGE NOTE PROVIDER - NPI NUMBER (FOR SYSADMIN USE ONLY) :
[[id number]] [[id number]] [[id number]] [[id number]],[UNKNOWN] [[id number]],[UNKNOWN] [[id number]],[UNKNOWN]

## 2024-01-04 NOTE — PROGRESS NOTE ADULT - SUBJECTIVE AND OBJECTIVE BOX
SUBJECTIVE / OVERNIGHT EVENTS:    patient seen and examined  resting comfortably in bed  c/o of mild soreness at incision site  eager to go home    --------------------------------------------------------------------------------------------  LABS:                        13.6   11.76 )-----------( 275      ( 04 Jan 2024 08:24 )             42.8     01-04    140  |  106  |  25<H>  ----------------------------<  84  4.2   |  19<L>  |  1.15    Ca    9.4      04 Jan 2024 08:24  Phos  3.2     01-03  Mg     2.1     01-03    TPro  5.8<L>  /  Alb  3.5  /  TBili  0.4  /  DBili  x   /  AST  13  /  ALT  8<L>  /  AlkPhos  71  01-03    PT/INR - ( 02 Jan 2024 21:34 )   PT: 11.4 sec;   INR: 1.09 ratio         PTT - ( 02 Jan 2024 21:34 )  PTT:32.3 sec  CAPILLARY BLOOD GLUCOSE            Urinalysis Basic - ( 04 Jan 2024 08:24 )    Color: x / Appearance: x / SG: x / pH: x  Gluc: 84 mg/dL / Ketone: x  / Bili: x / Urobili: x   Blood: x / Protein: x / Nitrite: x   Leuk Esterase: x / RBC: x / WBC x   Sq Epi: x / Non Sq Epi: x / Bacteria: x        RADIOLOGY & ADDITIONAL TESTS:    Imaging Personally Reviewed:  [x] YES  [ ] NO    Consultant(s) Notes Reviewed:  [x] YES  [ ] NO    MEDICATIONS  (STANDING):  albuterol/ipratropium for Nebulization. 3 milliLiter(s) Nebulizer once  bisacodyl 5 milliGRAM(s) Oral at bedtime  pantoprazole    Tablet 40 milliGRAM(s) Oral before breakfast  tamsulosin 0.4 milliGRAM(s) Oral at bedtime    MEDICATIONS  (PRN):  acetaminophen     Tablet .. 975 milliGRAM(s) Oral every 6 hours PRN Moderate Pain (4 - 6)  acetaminophen     Tablet .. 650 milliGRAM(s) Oral every 6 hours PRN Mild Pain (1 - 3)      Care Discussed with Consultants/Other Providers [x] YES  [ ] NO    Vital Signs Last 24 Hrs  T(C): 36.7 (04 Jan 2024 08:06), Max: 36.8 (03 Jan 2024 15:45)  T(F): 98 (04 Jan 2024 08:06), Max: 98.2 (03 Jan 2024 15:45)  HR: 78 (04 Jan 2024 08:06) (40 - 93)  BP: 157/83 (04 Jan 2024 08:06) (92/68 - 183/66)  BP(mean): 93 (04 Jan 2024 04:00) (77 - 126)  RR: 17 (04 Jan 2024 08:06) (12 - 19)  SpO2: 97% (04 Jan 2024 08:06) (90% - 98%)    Parameters below as of 04 Jan 2024 08:06  Patient On (Oxygen Delivery Method): nasal cannula  O2 Flow (L/min): 1    I&O's Summary    03 Jan 2024 07:01  -  04 Jan 2024 07:00  --------------------------------------------------------  IN: 230 mL / OUT: 650 mL / NET: -420 mL    04 Jan 2024 07:01  -  04 Jan 2024 10:51  --------------------------------------------------------  IN: 240 mL / OUT: 0 mL / NET: 240 mL    PHYSICAL EXAM:  GENERAL: NAD, well-developed, comfortable  HEAD:  Atraumatic, Normocephalic  EYES: EOMI, PERRLA, conjunctiva and sclera clear  NECK: Supple, No JVD  CHEST/LUNG: Clear to auscultation bilaterally; No wheeze, L ACW incision c/d/i, slight ecchymosis     HEART: Bradycardiac; No murmurs, rubs, or gallops  ABDOMEN: Soft, Nontender, Nondistended; Bowel sounds present  NEURO: AAOx3, no focal weakness, 5/5 b/l extremity strength, b/l knee no arthritis, no effusion   EXTREMITIES:  2+ Peripheral Pulses, No clubbing, cyanosis, or edema  SKIN: No rashes or lesions

## 2024-01-04 NOTE — DISCHARGE NOTE PROVIDER - NSDCCPCAREPLAN_GEN_ALL_CORE_FT
PRINCIPAL DISCHARGE DIAGNOSIS  Diagnosis: Bradycardia  Assessment and Plan of Treatment: Continue with follow-up visits to your electrophysiology team and with your home remote device monitoring (if applicable). Continue your medications as prescribed. Monitor your left chest wall site for swelling, bleeding.      SECONDARY DISCHARGE DIAGNOSES  Diagnosis: HTN (hypertension)  Assessment and Plan of Treatment: Continue with your blood pressure medications; eat a heart healthy diet with low salt diet; exercise regularly (consult with your physician or cardiologist first); maintain a heart healthy weight; if you smoke - quit (A resource to help you stop smoking is the [facility] for Tobacco Control – phone number [phone].); include healthy ways to manage stress. Continue to follow with your primary care physician or cardiologist.     PRINCIPAL DISCHARGE DIAGNOSIS  Diagnosis: Bradycardia  Assessment and Plan of Treatment: Continue with follow-up visits to your electrophysiology team and with your home remote device monitoring (if applicable). Continue your medications as prescribed. Monitor your left chest wall site for swelling, bleeding.      SECONDARY DISCHARGE DIAGNOSES  Diagnosis: HTN (hypertension)  Assessment and Plan of Treatment: Continue with your blood pressure medications; eat a heart healthy diet with low salt diet; exercise regularly (consult with your physician or cardiologist first); maintain a heart healthy weight; if you smoke - quit (A resource to help you stop smoking is the [facility] for Tobacco Control – phone number [phone].); include healthy ways to manage stress. Continue to follow with your primary care physician or cardiologist.     PRINCIPAL DISCHARGE DIAGNOSIS  Diagnosis: Bradycardia  Assessment and Plan of Treatment: Continue with follow-up visits to your electrophysiology team and with your home remote device monitoring (if applicable). Continue your medications as prescribed. Monitor your left chest wall site for swelling, bleeding.      SECONDARY DISCHARGE DIAGNOSES  Diagnosis: HTN (hypertension)  Assessment and Plan of Treatment: Continue with your blood pressure medications; eat a heart healthy diet with low salt diet; exercise regularly (consult with your physician or cardiologist first); maintain a heart healthy weight; if you smoke - quit (A resource to help you stop smoking is the [facility] for Tobacco Control – phone number [phone].); include healthy ways to manage stress. Continue to follow with your primary care physician or cardiologist.

## 2024-01-04 NOTE — DISCHARGE NOTE PROVIDER - NS AS DC PROVIDER CONTACT Y/N MULTI
Patient is calling stating that he needs a refill of his tamsulosin (FLOMAX) [facility].    Yes

## 2024-01-04 NOTE — PROGRESS NOTE ADULT - ASSESSMENT
A/p  83M w/ hx of HTN, HLD, CKD3, CAD c/b MI (2003 cath at [place], reportedly 80% stenosis but no stent), mild LV diastolic dysfunction, ?pulmonary sarcoid, prostate cancer s/p radiation, ?TIA/amnesia episodes, chronic migraines, OA, incarcerated in [place] ([phone]), presenting with intermittent dizziness/lightheadedness x2 wks and bradycardia to HR 30s earlier in the day.    #AV Block  -2:1 AV block on ecg  -EP following  -Echo with nml lv fx, mild-mod AS   -S/p PPM placement 1/4  -F/u further EP recs     #CAD, Hx MI  -Resume asa per EP    #HTN  -Resume losartan 100mg qd     #Aortic Stenosis  -Mild-Mod on echo  -Euvolemic on exam   -med management     #CKD  -Trend Cr    #Pulmonary Sarcoid  -Pulm eval           A/p  83M w/ hx of HTN, HLD, CKD3, CAD c/b MI (2003 cath at [place], reportedly 80% stenosis but no stent), mild LV diastolic dysfunction, ?pulmonary sarcoid, prostate cancer s/p radiation, ?TIA/amnesia episodes, chronic migraines, OA, incarcerated in [place] ([phone]), presenting with intermittent dizziness/lightheadedness x2 wks and bradycardia to HR 30s earlier in the day.    #AV Block  -2:1 AV block on ecg  -EP following  -Echo with nml lv fx, mild-mod AS   -S/p PPM placement 1/4  -F/u further EP recs     #CAD, Hx MI  -Resume asa per EP    #HTN  -Resume losartan 100mg qd     #Aortic Stenosis  -Mild-Mod on echo  -Euvolemic on exam   -med management     #CKD  -Trend Cr    #Pulmonary Sarcoid  -Pulm eval           A/p  83M w/ hx of HTN, HLD, CKD3, CAD c/b MI (2003 cath at [place], reportedly 80% stenosis but no stent), mild LV diastolic dysfunction, ?pulmonary sarcoid, prostate cancer s/p radiation, ?TIA/amnesia episodes, chronic migraines, OA, incarcerated in [place] ([phone]), presenting with intermittent dizziness/lightheadedness x2 wks and bradycardia to HR 30s earlier in the day.    #AV Block  -2:1 AV block on ecg  -EP following  -Echo with nml lv fx, mild-mod AS   -S/p PPM placement 1/4  -F/u further EP recs     #CAD, Hx MI  -Resume asa per EP    #HTN  -Resume losartan 100mg qd     #Aortic Stenosis  -Mild-Mod on echo  -Euvolemic on exam   -med management     #CKD  -Trend Cr    #Pulmonary Sarcoid  -Pulm eval           A/p  83M w/ hx of HTN, HLD, CKD3, CAD c/b MI (2003 cath at [place], reportedly 80% stenosis but no stent), mild LV diastolic dysfunction, ?pulmonary sarcoid, prostate cancer s/p radiation, ?TIA/amnesia episodes, chronic migraines, OA, incarcerated in [place] ([phone]), presenting with intermittent dizziness/lightheadedness x2 wks and bradycardia to HR 30s earlier in the day.    #AV Block  -2:1 AV block   -EP following  -Echo with nml lv fx, mild-mod AS   -S/p PPM placement 1/4    #CAD, Hx MI  -Resume asa per EP    #HTN  -Resume losartan 100mg qd     #Aortic Stenosis  -Mild-Mod on echo  -Euvolemic on exam   -med management     #CKD  -Trend Cr    #Pulmonary Sarcoid  -Pulm eval           A/p  83M w/ hx of HTN, HLD, CKD3, CAD c/b MI (2003 cath at [place], reportedly 80% stenosis but no stent), mild LV diastolic dysfunction, ?pulmonary sarcoid, prostate cancer s/p radiation, ?TIA/amnesia episodes, chronic migraines, OA, incarcerated in [place] ([phone]), presenting with intermittent dizziness/lightheadedness x2 wks and bradycardia to HR 30s earlier in the day.    #AV Block  -2:1 AV block   -EP following  -Echo with nml lv fx, mild-mod AS   -S/p PPM placement 1/4    #CAD, Hx MI  -Resume asa per EP    #HTN  -Resume losartan 100mg qd     #Aortic Stenosis  -Mild-Mod on echo  -Euvolemic on exam   -med management     #CKD  -Trend Cr    #Pulmonary Sarcoid  -Pulm eval           A/p  83M w/ hx of HTN, HLD, CKD3, CAD c/b MI (2003 cath at [place], reportedly 80% stenosis but no stent), mild LV diastolic dysfunction, ?pulmonary sarcoid, prostate cancer s/p radiation, ?TIA/amnesia episodes, chronic migraines, OA, incarcerated in [place] ([phone]), presenting with intermittent dizziness/lightheadedness x2 wks and bradycardia to HR 30s earlier in the day.    #AV Block  -2:1 AV block   -EP following  -Echo with nml lv fx, mild-mod AS   -S/p PPM placement 1/4    #CAD, Hx MI  -Resume asa per EP    #HTN  -Resume losartan 100mg qd     #Aortic Stenosis  -Mild-Mod on echo  -Euvolemic on exam   -med management     #CKD  -Trend Cr    #Pulmonary Sarcoid  -Pulm eval

## 2024-01-04 NOTE — DISCHARGE NOTE PROVIDER - CARE PROVIDER_API CALL
Jim Regan.  Cardiac Electrophysiology  [address] [place] [address]  Phone: [phone]  Fax: [phone]  Follow Up Time:    Jim Regan.  Cardiac Electrophysiology  [address] [place] [address]  Phone: [phone]  Fax: [phone]  Follow Up Time:    Jim Regan.  Cardiac Electrophysiology  [address] [place] [address]  Phone: [phone]  Fax: [phone]  Follow Up Time:    Jim Regan.  Cardiac Electrophysiology  [address] [place] [address]  Phone: [phone]  Fax: [phone]  Follow Up Time:     Wound check appointment at EP clinic,   [address] [place] [address]  Phone: (   )    -  Fax: (   )    -  Established Patient  Scheduled Appointment: 01/25/2024 01:40 PM   Jim Regan.  Cardiac Electrophysiology  [address] [place] [address]  Phone: [phone]  Fax: [phone]  Follow Up Time:     Wound check appointment at EP clinic,   [address] [place] [address]  Phone: (   )    -  Fax: (   )    -  Established Patient  Scheduled Appointment: 01/25/2024 01:40 PM   Jim Regan.  Cardiac Electrophysiology  [address] [place] [address]  Phone: [phone]  Fax: [phone]  Follow Up Time:     Wound check appointment at EP clinic,   [address] [place] [address]  Phone: (   )    -  Fax: (   )    -  Established Patient  Scheduled Appointment: 01/25/2024 01:40 PM

## 2024-01-04 NOTE — DISCHARGE NOTE PROVIDER - CARE PROVIDERS DIRECT ADDRESSES
,[email or web address],[email or web address] [email or web address],DirectAddress_Unknown [email or web address],DirectAddress_Unknown

## 2024-01-04 NOTE — PROGRESS NOTE ADULT - SUBJECTIVE AND OBJECTIVE BOX
CARDIOLOGY FOLLOW UP - Dr. Rondon  DATE OF SERVICE: 1/4/4    CC  No CV complaints    REVIEW OF SYSTEMS:  CONSTITUTIONAL: No fever, weight loss, or fatigue  RESPIRATORY: No cough, wheezing, chills or hemoptysis; No Shortness of Breath  CARDIOVASCULAR: No chest pain, palpitations, passing out, dizziness, or leg swelling  GASTROINTESTINAL: No abdominal or epigastric pain. No nausea, vomiting, or hematemesis; No diarrhea or constipation. No melena or hematochezia.  VASCULAR: No edema     PHYSICAL EXAM:  T(C): 36.7 (01-04-24 @ 08:06), Max: 36.8 (01-03-24 @ 15:45)  HR: 78 (01-04-24 @ 08:06) (40 - 93)  BP: 157/83 (01-04-24 @ 08:06) (92/68 - 183/66)  RR: 17 (01-04-24 @ 08:06) (12 - 19)  SpO2: 97% (01-04-24 @ 08:06) (90% - 98%)  Wt(kg): --  I&O's Summary    03 Jan 2024 07:01  -  04 Jan 2024 07:00  --------------------------------------------------------  IN: 230 mL / OUT: 650 mL / NET: -420 mL    04 Jan 2024 07:01  -  04 Jan 2024 09:56  --------------------------------------------------------  IN: 240 mL / OUT: 0 mL / NET: 240 mL        Appearance: Normal	  Cardiovascular: Normal S1 S2,RRR, No JVD, No murmurs  Respiratory: Lungs clear to auscultation b/l   Gastrointestinal:  Soft, Non-tender, + BS	  Extremities: Normal range of motion, No clubbing, cyanosis or edema      Home Medications:  acetaminophen 325 mg oral tablet: 2 tab(s) orally every 6 hours As needed Mild Pain (1 - 3) (04 Jan 2024 03:52)  aspirin 81 mg oral tablet: 1 tab(s) orally once a day (03 Jan 2024 01:17)  Dulcolax Laxative 5 mg oral tablet: 1 tab(s) orally once a day (at bedtime) (03 Jan 2024 01:17)  losartan 100 mg oral tablet: 1 tab(s) orally once a day (03 Jan 2024 01:17)  OTC generic aleve/NSAID: Takes 1 tab daily (03 Jan 2024 01:17)  Protonix 40 mg oral delayed release tablet: 1 tab(s) orally once a day (03 Jan 2024 01:17)  tamsulosin 0.4 mg oral capsule: 1 cap(s) orally once a day (at bedtime) (03 Jan 2024 01:17)      MEDICATIONS  (STANDING):  albuterol/ipratropium for Nebulization. 3 milliLiter(s) Nebulizer once  bisacodyl 5 milliGRAM(s) Oral at bedtime  pantoprazole    Tablet 40 milliGRAM(s) Oral before breakfast  tamsulosin 0.4 milliGRAM(s) Oral at bedtime      TELEMETRY: vpaced 80s 	    ECG:  	  RADIOLOGY:   < from: Xray Chest 2 Views PA/Lat (01.04.24 @ 05:05) >  IMPRESSION:  Status post left chest wall pacemaker placement without pneumothorax.    --- End of Report ---    < end of copied text >    DIAGNOSTIC TESTING:  [x] Echocardiogram:  < from: TTE W or WO Ultrasound Enhancing Agent (01.03.24 @ 12:53) >  CONCLUSIONS:      1. Technically difficult image quality.   2. Left ventricular cavity is normal. Left ventricular wall thickness is normal. Left ventricular systolic function is normal with an ejection fraction visually estimated at 50 to 55 %. There are no regional wall motion abnormalities seen.   3. Mild to moderate aortic stenosis.    < end of copied text >    [ ]  Catheterization:  [ ] Stress Test:    OTHER: 	    LABS:	 	    Troponin T, High Sensitivity Result: 30 ng/L [0 - 51] (01-02 @ 22:47)  Troponin T, High Sensitivity Result: 30 ng/L [0 - 51] (01-02 @ 20:18)                          13.6   11.76 )-----------( 275      ( 04 Jan 2024 08:24 )             42.8     01-04    140  |  106  |  25<H>  ----------------------------<  84  4.2   |  19<L>  |  1.15    Ca    9.4      04 Jan 2024 08:24  Phos  3.2     01-03  Mg     2.1     01-03    TPro  5.8<L>  /  Alb  3.5  /  TBili  0.4  /  DBili  x   /  AST  13  /  ALT  8<L>  /  AlkPhos  71  01-03    PT/INR - ( 02 Jan 2024 21:34 )   PT: 11.4 sec;   INR: 1.09 ratio         PTT - ( 02 Jan 2024 21:34 )  PTT:32.3 sec

## 2024-01-04 NOTE — DISCHARGE NOTE PROVIDER - PROVIDER TOKENS
PROVIDER:[TOKEN:[[id number]]] PROVIDER:[TOKEN:[[id number]]] PROVIDER:[TOKEN:[[id number]]] PROVIDER:[TOKEN:[[id number]]],FREE:[LAST:[Wound check appointment at EP clinic],PHONE:[(   )    -],FAX:[(   )    -],ADDRESS:[[address] [place] [address]],SCHEDULEDAPPT:[01/25/2024],SCHEDULEDAPPTTIME:[01:40 PM],ESTABLISHEDPATIENT:[T]] PROVIDER:[TOKEN:[[id number]]],FREE:[LAST:[Wound check appointment at EP clinic],PHONE:[(   )    -],FAX:[(   )    -],ADDRESS:[[address] [place] [address]],SCHEDULEDAPPT:[01/25/2024],SCHEDULEDAPPTTIME:[01:40 PM],ESTABLISHEDPATIENT:[T]] PROVIDER:[TOKEN:[[id number]]],FREE:[LAST:[Wound check appointment at EP clinic],PHONE:[(   )    -],FAX:[(   )    -],ADDRESS:[[address] [place] [address]],SCHEDULEDAPPT:[01/25/2024],SCHEDULEDAPPTTIME:[01:40 PM],ESTABLISHEDPATIENT:[T]]

## 2024-01-04 NOTE — PROGRESS NOTE ADULT - ASSESSMENT
Patient is a 83 year old male with PMHx of CAD c/b MI (2003 cath at [place], reportedly 80% stenosis but no stent), CKD3, HLD, HTN, Mild LV Diastolic Dysfunction, ?pulmonary sarcoid, prostate cancer s/p radiation, ?TIA/amnesia episodes, Chronic migraines, OA, incarcerated in [place] ([phone]). Presents to [facility] with symptomatic bradycardia. 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 AV Block:  - Prior EKGs showed NSR w/ RBBB and 1st degree AVB (not on BB)  - Holter monitor (2/2023) had shown "43 pauses >2 sec longest 2.2 sec; 2% isolated PVC;" TTE (2/15/23) showed LVEF 72%, mild (grade I) diastolic dysfunction, mild-mod AR; Lexiscan stress test (3/2023) was neg for ischemia  - f/u TTE  - NPO for possible PPM today or tomorrow  - Fu TSH  - avoid AV lyn blocking agents  - K >4, Mg >2  - Monitor on tele  - EP/Cards following    # CAD/HLD/HTN:  - Holding home antihypertensives for now per Cardiology  - Resume ASA  - Monitor BP  - Cards following    # CKD3:  - Baseline 1.3 as per [facility]  - Bun/Cr 21/1.32 on admission  - Monitor I/O's  - Serial Cr  - Avoid nephrotoxins  - Renally dose medications  - Continue to monitor    # Hx of Prostate cancer:  - s/p radiation  - C/w home Tamsulosin   - + frequency, UA negative    # OA:  - Hx of OA and L meniscus issue, for which pt reported taking OTC generic aleve/NSAID daily    # Pulmonary sarcoidosis:  - Per outpatient pulm note (5/2023), pt was told he had pulm sarcoid prior to 2005 and his CT was suggestive of sarcoid. No hx of tissue biopsy [facility] in setting of [facility]adycardia); previous niox and spirometry testing was within normal.  - Outpatient Pulm follow-up    # Chronic constipation:  - C/w home PO Dulcolax    # DVT ppx:  - IPCs    [facility]           Patient is a 83 year old male with PMHx of CAD c/b MI (2003 cath at [place], reportedly 80% stenosis but no stent), CKD3, HLD, HTN, Mild LV Diastolic Dysfunction, ?pulmonary sarcoid, prostate cancer s/p radiation, ?TIA/amnesia episodes, Chronic migraines, OA, incarcerated in [place] ([phone]). Presents to [facility] with symptomatic bradycardia. 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 AV Block:  - Prior EKGs showed NSR w/ RBBB and 1st degree AVB (not on BB)  - Holter monitor (2/2023) had shown "43 pauses >2 sec longest 2.2 sec; 2% isolated PVC;" TTE (2/15/23) showed LVEF 72%, mild (grade I) diastolic dysfunction, mild-mod AR; Lexiscan stress test (3/2023) was neg for ischemia  - f/u TTE  - NPO for possible PPM today or tomorrow  - Fu TSH  - avoid AV lyn blocking agents  - K >4, Mg >2  - Monitor on tele  - EP/Cards following    # CAD/HLD/HTN:  - Holding home antihypertensives for now per Cardiology  - Resume ASA  - Monitor BP  - Cards following    # CKD3:  - Baseline 1.3 as per [facility]  - Bun/Cr 21/1.32 on admission  - Monitor I/O's  - Serial Cr  - Avoid nephrotoxins  - Renally dose medications  - Continue to monitor    # Hx of Prostate cancer:  - s/p radiation  - C/w home Tamsulosin   - + frequency, UA negative    # OA:  - Hx of OA and L meniscus issue, for which pt reported taking OTC generic aleve/NSAID daily    # Pulmonary sarcoidosis:  - Per outpatient pulm note (5/2023), pt was told he had pulm sarcoid prior to 2005 and his CT was suggestive of sarcoid. No hx of tissue biopsy ([facility] in setting of bradycardia); previous niox and spirometry testing was within normal.  - Outpatient Pulm follow-up    # Chronic constipation:  - C/w home PO Dulcolax    # DVT ppx:  - IPCs    [facility]           Patient is a 83 year old male with PMHx of CAD c/b MI (2003 cath at [place], reportedly 80% stenosis but no stent), CKD3, HLD, HTN, Mild LV Diastolic Dysfunction, ?pulmonary sarcoid, prostate cancer s/p radiation, ?TIA/amnesia episodes, Chronic migraines, OA, incarcerated in [place] ([phone]). Presents to [facility] with symptomatic bradycardia. 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 AV Block:  - Prior EKGs showed NSR w/ RBBB and 1st degree AVB (not on BB)  - Holter monitor (2/2023) had shown "43 pauses >2 sec longest 2.2 sec; 2% isolated PVC;" TTE (2/15/23) showed LVEF 72%, mild (grade I) diastolic dysfunction, mild-mod AR; Lexiscan stress test (3/2023) was neg for ischemia  - f/u TTE  - NPO for possible PPM today or tomorrow  - Fu TSH  - avoid AV lyn blocking agents  - K >4, Mg >2  - Monitor on tele  - EP/Cards following    # CAD/HLD/HTN:  - Holding home antihypertensives for now per Cardiology  - Resume ASA  - Monitor BP  - Cards following    # CKD3:  - Baseline 1.3 as per [facility]  - Bun/Cr 21/1.32 on admission  - Monitor I/O's  - Serial Cr  - Avoid nephrotoxins  - Renally dose medications  - Continue to monitor    # Hx of Prostate cancer:  - s/p radiation  - C/w home Tamsulosin   - + frequency, UA negative    # OA:  - Hx of OA and L meniscus issue, for which pt reported taking OTC generic aleve/NSAID daily    # Pulmonary sarcoidosis:  - Per outpatient pulm note (5/2023), pt was told he had pulm sarcoid prior to 2005 and his CT was suggestive of sarcoid. No hx of tissue biopsy ([facility] in setting of bradycardia); previous niox and spirometry testing was within normal.  - Outpatient Pulm follow-up    # Chronic constipation:  - C/w home PO Dulcolax    # DVT ppx:  - IPCs    [facility]           Patient is a 83 year old male with PMHx of CAD c/b MI (2003 cath at [place], reportedly 80% stenosis but no stent), CKD3, HLD, HTN, Mild LV Diastolic Dysfunction, ?pulmonary sarcoid, prostate cancer s/p radiation, ?TIA/amnesia episodes, Chronic migraines, OA, incarcerated in [place] ([phone]). Presents to [facility] with symptomatic bradycardia. 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 AV Block:  - Prior EKGs showed NSR w/ RBBB and 1st degree AVB (not on BB)  - Holter monitor (2/2023) had shown "43 pauses >2 sec longest 2.2 sec; 2% isolated PVC;" TTE (2/15/23) showed LVEF 72%, mild (grade I) diastolic dysfunction, mild-mod AR; Lexiscan stress test (3/2023) was neg for ischemia  - Echo with nml lv fx, mild-mod AS   - s/p PPM placement 1/4  - EP/Cards following    # CAD/HLD/HTN:  - Holding home antihypertensives for now per Cardiology  - Resume ASA  - Monitor BP  - Cards following    # CKD3:  - Baseline 1.3 as per [facility]  - Bun/Cr 21/1.32 on admission  - Monitor I/O's  - Serial Cr  - Avoid nephrotoxins  - Renally dose medications  - Continue to monitor    # Hx of Prostate cancer:  - s/p radiation  - C/w home Tamsulosin   - + frequency, UA negative    # OA:  - Hx of OA and L meniscus issue, for which pt reported taking OTC generic aleve/NSAID daily    # Pulmonary sarcoidosis:  - Per outpatient pulm note (5/2023), pt was told he had pulm sarcoid prior to 2005 and his CT was suggestive of sarcoid. No hx of tissue biopsy ([facility] in setting of bradycardia); previous niox and spirometry testing was within normal.  - Outpatient Pulm follow-up    # Chronic constipation:  - C/w home PO Dulcolax    # DVT ppx:  - IPCs    [facility]  [phone]         Patient is a 83 year old male with PMHx of CAD c/b MI (2003 cath at [place], reportedly 80% stenosis but no stent), CKD3, HLD, HTN, Mild LV Diastolic Dysfunction, ?pulmonary sarcoid, prostate cancer s/p radiation, ?TIA/amnesia episodes, Chronic migraines, OA, incarcerated in [place] ([phone]). Presents to [facility] with symptomatic bradycardia. 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 AV Block:  - Prior EKGs showed NSR w/ RBBB and 1st degree AVB (not on BB)  - Holter monitor (2/2023) had shown "43 pauses >2 sec longest 2.2 sec; 2% isolated PVC;" TTE (2/15/23) showed LVEF 72%, mild (grade I) diastolic dysfunction, mild-mod AR; Lexiscan stress test (3/2023) was neg for ischemia  - Echo with nml lv fx, mild-mod AS   - s/p PPM placement 1/4  - EP/Cards following    # CAD/HLD/HTN:  - Holding home antihypertensives for now per Cardiology  - Resume ASA  - Monitor BP  - Cards following    # CKD3:  - Baseline 1.3 as per [facility]  - Bun/Cr 21/1.32 on admission  - Monitor I/O's  - Serial Cr  - Avoid nephrotoxins  - Renally dose medications  - Continue to monitor    # Hx of Prostate cancer:  - s/p radiation  - C/w home Tamsulosin   - + frequency, UA negative    # OA:  - Hx of OA and L meniscus issue, for which pt reported taking OTC generic aleve/NSAID daily    # Pulmonary sarcoidosis:  - Per outpatient pulm note (5/2023), pt was told he had pulm sarcoid prior to 2005 and his CT was suggestive of sarcoid. No hx of tissue biopsy ([facility] in setting of bradycardia); previous niox and spirometry testing was within normal.  - Outpatient Pulm follow-up    # Chronic constipation:  - C/w home PO Dulcolax    # DVT ppx:  - IPCs    [facility]  [phone]         Patient is a 83 year old male with PMHx of CAD c/b MI (2003 cath at [place], reportedly 80% stenosis but no stent), CKD3, HLD, HTN, Mild LV Diastolic Dysfunction, ?pulmonary sarcoid, prostate cancer s/p radiation, ?TIA/amnesia episodes, Chronic migraines, OA, incarcerated in [place] ([phone]). Presents to [facility] with symptomatic bradycardia. 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 AV Block:  - Prior EKGs showed NSR w/ RBBB and 1st degree AVB (not on BB)  - Holter monitor (2/2023) had shown "43 pauses >2 sec longest 2.2 sec; 2% isolated PVC;" TTE (2/15/23) showed LVEF 72%, mild (grade I) diastolic dysfunction, mild-mod AR; Lexiscan stress test (3/2023) was neg for ischemia  - Echo with nml lv fx, mild-mod AS   - s/p PPM placement 1/4  - EP/Cards following    # CAD/HLD/HTN:  - Holding home antihypertensives for now per Cardiology  - Resume ASA  - Monitor BP  - Cards following    # CKD3:  - Baseline 1.3 as per [facility]  - Bun/Cr 21/1.32 on admission  - Monitor I/O's  - Serial Cr  - Avoid nephrotoxins  - Renally dose medications  - Continue to monitor    # Hx of Prostate cancer:  - s/p radiation  - C/w home Tamsulosin   - + frequency, UA negative    # OA:  - Hx of OA and L meniscus issue, for which pt reported taking OTC generic aleve/NSAID daily    # Pulmonary sarcoidosis:  - Per outpatient pulm note (5/2023), pt was told he had pulm sarcoid prior to 2005 and his CT was suggestive of sarcoid. No hx of tissue biopsy ([facility] in setting of bradycardia); previous niox and spirometry testing was within normal.  - Outpatient Pulm follow-up    # Chronic constipation:  - C/w home PO Dulcolax    # DVT ppx:  - IPCs    [facility]  [phone]

## 2024-01-04 NOTE — DISCHARGE NOTE PROVIDER - NSDCACTIVITY_GEN_ALL_CORE
Pop Eagle was seen and treated in our emergency department on 2/25/2021  Diagnosis:     Oly Thomas    He may return on this date: If you have any questions or concerns, please don't hesitate to call        Jose Martin Pradhan DO    ______________________________           _______________          _______________  Hospital Representative                              Date                                Time Walking - Indoors allowed/No heavy lifting/straining/Walking - Outdoors allowed/Follow Instructions Provided by your Surgical Team

## 2024-01-04 NOTE — PROGRESS NOTE ADULT - ASSESSMENT
84 y/o M with PMH of HTN, HLD, CAD, ?pulmonary sarcoid, presenting with 2 weeks of intermittent dizziness on exertion, noted to be in 2:1 AVB for which EP is consulted.     #2:1 AV block  Pt p/w dizziness found to be in 2:1 AV block. 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 Lactate reassuringly normal. Likely that block is 2/2 to age related conduction disease, however unclear if pt has sarcoid given underlying pulm sarcoid. Regardless, given patient is symptomatic, PPM is indicated.    - s/p PPM implant 1/3/23 ([employer] 2272)  - Post op pacemaker instructions reviewed with patient  - ID and booklet given to patient and taken home by family  - CXR PA/Lat pending  - PPM interrogation by rep today  - Pending f/uf appt with EP CLinic  - Telemetry monitoring  - Check BMP/CBC today priro to poss discharge    #[id number] 84 y/o M with PMH of HTN, HLD, CAD, ?pulmonary sarcoid, presenting with 2 weeks of intermittent dizziness on exertion, noted to be in 2:1 AVB for which EP is consulted.     #2:1 AV block  Pt p/w dizziness found to be in 2:1 AV block. 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 Lactate reassuringly normal. Likely that block is 2/2 to age related conduction disease, however unclear if pt has sarcoid given underlying pulm sarcoid. Regardless, given patient is symptomatic, PPM is indicated.    - s/p PPM implant 1/3/23 ([employer] 2272)  - Post op pacemaker instructions reviewed with patient  - ID and booklet given to patient and taken home by family  - CXR PA/Lat pending  - PPM interrogation by rep today  - Pending f/uf appt with EP CLinic  - Telemetry monitoring  - Check BMP/CBC today priro to poss discharge    #[id number] 82 y/o M with PMH of HTN, HLD, CAD, ?pulmonary sarcoid, presenting with 2 weeks of intermittent dizziness on exertion, noted to be in 2:1 AVB for which EP is consulted.     #2:1 AV block  Pt p/w dizziness found to be in 2:1 AV block. 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 Lactate reassuringly normal. Likely that block is 2/2 to age related conduction disease, however unclear if pt has sarcoid given underlying pulm sarcoid. Regardless, given patient is symptomatic, PPM is indicated.    - s/p PPM implant 1/3/23 ([employer] 2272)  - Post op pacemaker instructions reviewed with patient  - ID and booklet given to patient and taken home by family  - CXR PA/Lat pending  - PPM interrogation by rep today  - Pending f/uf appt with EP CLinic  - Telemetry monitoring  - Check BMP/CBC today priro to poss discharge    #[id number]

## 2024-01-05 ENCOUNTER — TRANSCRIPTION ENCOUNTER (OUTPATIENT)
Age: 84
End: 2024-01-05

## 2024-01-05 VITALS
DIASTOLIC BLOOD PRESSURE: 80 MMHG | RESPIRATION RATE: 16 BRPM | OXYGEN SATURATION: 95 % | SYSTOLIC BLOOD PRESSURE: 140 MMHG | HEART RATE: 86 BPM | TEMPERATURE: 97 F

## 2024-01-05 LAB
ANION GAP SERPL CALC-SCNC: 10 MMOL/L — SIGNIFICANT CHANGE UP (ref 5–17)
BUN SERPL-MCNC: 26 MG/DL — HIGH (ref 7–23)
CALCIUM SERPL-MCNC: 8.8 MG/DL — SIGNIFICANT CHANGE UP (ref 8.4–10.5)
CHLORIDE SERPL-SCNC: 107 MMOL/L — SIGNIFICANT CHANGE UP (ref 96–108)
CO2 SERPL-SCNC: 23 MMOL/L — SIGNIFICANT CHANGE UP (ref 22–31)
CREAT SERPL-MCNC: 1.27 MG/DL — SIGNIFICANT CHANGE UP (ref 0.5–1.3)
EGFR: 56 ML/MIN/1.73M2 — LOW
GLUCOSE SERPL-MCNC: 87 MG/DL — SIGNIFICANT CHANGE UP (ref 70–99)
HCT VFR BLD CALC: 37.6 % — LOW (ref 39–50)
HGB BLD-MCNC: 12.3 G/DL — LOW (ref 13–17)
MAGNESIUM SERPL-MCNC: 2.1 MG/DL — SIGNIFICANT CHANGE UP (ref 1.6–2.6)
MCHC RBC-ENTMCNC: 27 PG — SIGNIFICANT CHANGE UP (ref 27–34)
MCHC RBC-ENTMCNC: 32.7 GM/DL — SIGNIFICANT CHANGE UP (ref 32–36)
MCV RBC AUTO: 82.6 FL — SIGNIFICANT CHANGE UP (ref 80–100)
NRBC # BLD: 0 /100 WBCS — SIGNIFICANT CHANGE UP (ref 0–0)
PHOSPHATE SERPL-MCNC: 2.6 MG/DL — SIGNIFICANT CHANGE UP (ref 2.5–4.5)
PLATELET # BLD AUTO: 225 K/UL — SIGNIFICANT CHANGE UP (ref 150–400)
POTASSIUM SERPL-MCNC: 4.1 MMOL/L — SIGNIFICANT CHANGE UP (ref 3.5–5.3)
POTASSIUM SERPL-SCNC: 4.1 MMOL/L — SIGNIFICANT CHANGE UP (ref 3.5–5.3)
RBC # BLD: 4.55 M/UL — SIGNIFICANT CHANGE UP (ref 4.2–5.8)
RBC # FLD: 14 % — SIGNIFICANT CHANGE UP (ref 10.3–14.5)
SODIUM SERPL-SCNC: 140 MMOL/L — SIGNIFICANT CHANGE UP (ref 135–145)
WBC # BLD: 9.46 K/UL — SIGNIFICANT CHANGE UP (ref 3.8–10.5)
WBC # FLD AUTO: 9.46 K/UL — SIGNIFICANT CHANGE UP (ref 3.8–10.5)

## 2024-01-05 PROCEDURE — C8929: CPT

## 2024-01-05 PROCEDURE — 33208 INSRT HEART PM ATRIAL & VENT: CPT | Mod: KX

## 2024-01-05 PROCEDURE — 81001 URINALYSIS AUTO W/SCOPE: CPT

## 2024-01-05 PROCEDURE — 85027 COMPLETE CBC AUTOMATED: CPT

## 2024-01-05 PROCEDURE — 84436 ASSAY OF TOTAL THYROXINE: CPT

## 2024-01-05 PROCEDURE — 83735 ASSAY OF MAGNESIUM: CPT

## 2024-01-05 PROCEDURE — 71046 X-RAY EXAM CHEST 2 VIEWS: CPT

## 2024-01-05 PROCEDURE — C1892: CPT

## 2024-01-05 PROCEDURE — 86900 BLOOD TYPING SEROLOGIC ABO: CPT

## 2024-01-05 PROCEDURE — 71045 X-RAY EXAM CHEST 1 VIEW: CPT

## 2024-01-05 PROCEDURE — 83605 ASSAY OF LACTIC ACID: CPT

## 2024-01-05 PROCEDURE — 83880 ASSAY OF NATRIURETIC PEPTIDE: CPT

## 2024-01-05 PROCEDURE — C1785: CPT

## 2024-01-05 PROCEDURE — 84132 ASSAY OF SERUM POTASSIUM: CPT

## 2024-01-05 PROCEDURE — 82435 ASSAY OF BLOOD CHLORIDE: CPT

## 2024-01-05 PROCEDURE — 82330 ASSAY OF CALCIUM: CPT

## 2024-01-05 PROCEDURE — 86901 BLOOD TYPING SEROLOGIC RH(D): CPT

## 2024-01-05 PROCEDURE — C1769: CPT

## 2024-01-05 PROCEDURE — 85025 COMPLETE CBC W/AUTO DIFF WBC: CPT

## 2024-01-05 PROCEDURE — 80048 BASIC METABOLIC PNL TOTAL CA: CPT

## 2024-01-05 PROCEDURE — 85014 HEMATOCRIT: CPT

## 2024-01-05 PROCEDURE — 84480 ASSAY TRIIODOTHYRONINE (T3): CPT

## 2024-01-05 PROCEDURE — 87637 SARSCOV2&INF A&B&RSV AMP PRB: CPT

## 2024-01-05 PROCEDURE — 93308 TTE F-UP OR LMTD: CPT

## 2024-01-05 PROCEDURE — 36415 COLL VENOUS BLD VENIPUNCTURE: CPT

## 2024-01-05 PROCEDURE — 93321 DOPPLER ECHO F-UP/LMTD STD: CPT

## 2024-01-05 PROCEDURE — 82803 BLOOD GASES ANY COMBINATION: CPT

## 2024-01-05 PROCEDURE — C1894: CPT

## 2024-01-05 PROCEDURE — 99285 EMERGENCY DEPT VISIT HI MDM: CPT

## 2024-01-05 PROCEDURE — 85730 THROMBOPLASTIN TIME PARTIAL: CPT

## 2024-01-05 PROCEDURE — 84443 ASSAY THYROID STIM HORMONE: CPT

## 2024-01-05 PROCEDURE — 84484 ASSAY OF TROPONIN QUANT: CPT

## 2024-01-05 PROCEDURE — 80053 COMPREHEN METABOLIC PANEL: CPT

## 2024-01-05 PROCEDURE — 84295 ASSAY OF SERUM SODIUM: CPT

## 2024-01-05 PROCEDURE — 84100 ASSAY OF PHOSPHORUS: CPT

## 2024-01-05 PROCEDURE — 93005 ELECTROCARDIOGRAM TRACING: CPT

## 2024-01-05 PROCEDURE — 93010 ELECTROCARDIOGRAM REPORT: CPT

## 2024-01-05 PROCEDURE — C1898: CPT

## 2024-01-05 PROCEDURE — 85018 HEMOGLOBIN: CPT

## 2024-01-05 PROCEDURE — 84439 ASSAY OF FREE THYROXINE: CPT

## 2024-01-05 PROCEDURE — 85610 PROTHROMBIN TIME: CPT

## 2024-01-05 PROCEDURE — 82947 ASSAY GLUCOSE BLOOD QUANT: CPT

## 2024-01-05 PROCEDURE — 86850 RBC ANTIBODY SCREEN: CPT

## 2024-01-05 RX ADMIN — Medication 650 MILLIGRAM(S): at 06:41

## 2024-01-05 RX ADMIN — PANTOPRAZOLE SODIUM 40 MILLIGRAM(S): 20 TABLET, DELAYED RELEASE ORAL at 06:41

## 2024-01-05 RX ADMIN — Medication 650 MILLIGRAM(S): at 07:11

## 2024-01-05 NOTE — DISCHARGE NOTE NURSING/CASE MANAGEMENT/SOCIAL WORK - PATIENT PORTAL LINK FT
You can access the FollowMyHealth Patient Portal offered by [facility] by registering at the following website: [email or web address]. By joining [employer]’s FollowMyHealth portal, you will also be able to view your health information using other applications (apps) compatible with our system. You can access the FollowMyHealth Patient Portal offered by [facility] by registering at the following website: [email or web address]. By joining [employer]’s FollowMyHealth portal, you will also be able to view your health information using other applications (apps) compatible with our system. You can access the FollowMyHealth Patient Portal offered by [facility] by registering at the following website: [email or web address]. By joining [employer]’s FollowMyHealth portal, you will also be able to view your health information using other applications (apps) compatible with our system.

## 2024-01-05 NOTE — PROGRESS NOTE ADULT - SUBJECTIVE AND OBJECTIVE BOX
Cardiology Progress Note  ------------------------------------------------------------------------------------------    SUBJECTIVE/EVENTS:  - Tele: No events  - NAEO, QTc now <500    -------------------------------------------------------------------------------------------  ROS:  CV: chest pain (-), palpitation (-), orthopnea (-), PND (-), edema (-)  PULM: SOB (-), cough (-), wheezing (-), hemoptysis (-).   CONST: fever (-), chills (-) or fatigue (-)  GI: abdominal distension (-), abdominal pain (-) , nausea/vomiting (-), hematemesis, (-), melena (-), hematochezia (-)  : dysuria (-), frequency (-), hematuria (-).   NEURO: numbness (-), weakness (-), dizziness (-)  MSK: myalgia (-), joint pain (-)   SKIN: itching (-), rash (-)  HEENT:  visual changes (-); vertigo or throat pain (-);  neck stiffness (-)   Psych: change in mood (-), anxiety (-), depression (-)     All other review of systems is negative unless indicated above.   -------------------------------------------------------------------------------------------  VITALS:  T(F): 97.4 (01-05), Max: 98.2 (01-05)  HR: 80 (01-05) (76 - 82)  BP: 140/78 (01-05) (140/78 - 165/85)  RR: 17 (01-05)  SpO2: 97% (01-05)  I&O's Summary    04 Jan 2024 07:01  -  05 Jan 2024 07:00  --------------------------------------------------------  IN: 600 mL / OUT: 670 mL / NET: -70 mL      ------------------------------------------------------------------------------------------  PHYSICAL EXAM:  GEN: NAD, sitting in bed  HEENT: NCAT, EOMI  CV: RRR, Ns1/s2, no m/r/g, JVP not elevated  RESP: CTA B/l, no w/r/r  ABD: soft, nt, nd  EXT: warm, 2+ pulses, no edema  SKIN: no visible lesions, rashes  NEURO: AAOx3, no focal deficits  PSYCH: Calm, cooperative  -------------------------------------------------------------------------------------------  LABS:                          12.3   9.46  )-----------( 225      ( 05 Jan 2024 01:16 )             37.6     01-05    140  |  107  |  26<H>  ----------------------------<  87  4.1   |  23  |  1.27    Ca    8.8      05 Jan 2024 01:16  Phos  2.6     01-05  Mg     2.1     01-05        CARDIAC MARKERS ( 02 Jan 2024 22:47 )  30 ng/L / x     / x     / x     / x     / x      CARDIAC MARKERS ( 02 Jan 2024 20:18 )  30 ng/L / x     / x     / x     / x     / x                -------------------------------------------------------------------------------------------  Meds:  acetaminophen     Tablet .. 650 milliGRAM(s) Oral every 6 hours PRN  acetaminophen     Tablet .. 975 milliGRAM(s) Oral every 6 hours PRN  albuterol/ipratropium for Nebulization. 3 milliLiter(s) Nebulizer once  bisacodyl 5 milliGRAM(s) Oral at bedtime  pantoprazole    Tablet 40 milliGRAM(s) Oral before breakfast  tamsulosin 0.4 milliGRAM(s) Oral at bedtime    -------------------------------------------------------------------------------------------  Cardiovascular Diagnostic Testing:      -------------------------------------------------------------------------------------------  Assessment and Plan:   82 y/o M with PMH of HTN, HLD, CAD, ?pulmonary sarcoid, presenting with 2 weeks of intermittent dizziness on exertion, noted to be in 2:1 AVB for which EP is consulted.     #2:1 AV block  Pt p/w dizziness found to be in 2:1 AV block. 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 Lactate reassuringly normal. Likely that block is 2/2 to age related conduction disease, however unclear if pt has sarcoid given underlying pulm sarcoid. Regardless, given patient is symptomatic, PPM is indicated. Hospital course c/b delirium, pt received haldol found to have slight prolonged QTc following, now normalized.    - s/p PPM implant 1/3/23 ([employer] 2272)  - Post op pacemaker instructions reviewed with patient  - ID and booklet given to patient and taken home by family  - Pending f/uf appt with EP CLinic    *** Recommendations are preliminary until cosigned by the attending.    Cayden Aranda MD  Cardiology Fellow    For all New Consults and Questions:  [email or web address]   Login: [facility] Cardiology Progress Note  ------------------------------------------------------------------------------------------    SUBJECTIVE/EVENTS:  - Tele: No events  - NAEO, QTc now <500    -------------------------------------------------------------------------------------------  ROS:  CV: chest pain (-), palpitation (-), orthopnea (-), PND (-), edema (-)  PULM: SOB (-), cough (-), wheezing (-), hemoptysis (-).   CONST: fever (-), chills (-) or fatigue (-)  GI: abdominal distension (-), abdominal pain (-) , nausea/vomiting (-), hematemesis, (-), melena (-), hematochezia (-)  : dysuria (-), frequency (-), hematuria (-).   NEURO: numbness (-), weakness (-), dizziness (-)  MSK: myalgia (-), joint pain (-)   SKIN: itching (-), rash (-)  HEENT:  visual changes (-); vertigo or throat pain (-);  neck stiffness (-)   Psych: change in mood (-), anxiety (-), depression (-)     All other review of systems is negative unless indicated above.   -------------------------------------------------------------------------------------------  VITALS:  T(F): 97.4 (01-05), Max: 98.2 (01-05)  HR: 80 (01-05) (76 - 82)  BP: 140/78 (01-05) (140/78 - 165/85)  RR: 17 (01-05)  SpO2: 97% (01-05)  I&O's Summary    04 Jan 2024 07:01  -  05 Jan 2024 07:00  --------------------------------------------------------  IN: 600 mL / OUT: 670 mL / NET: -70 mL      ------------------------------------------------------------------------------------------  PHYSICAL EXAM:  GEN: NAD, sitting in bed  HEENT: NCAT, EOMI  CV: RRR, Ns1/s2, no m/r/g, JVP not elevated  RESP: CTA B/l, no w/r/r  ABD: soft, nt, nd  EXT: warm, 2+ pulses, no edema  SKIN: no visible lesions, rashes  NEURO: AAOx3, no focal deficits  PSYCH: Calm, cooperative  -------------------------------------------------------------------------------------------  LABS:                          12.3   9.46  )-----------( 225      ( 05 Jan 2024 01:16 )             37.6     01-05    140  |  107  |  26<H>  ----------------------------<  87  4.1   |  23  |  1.27    Ca    8.8      05 Jan 2024 01:16  Phos  2.6     01-05  Mg     2.1     01-05        CARDIAC MARKERS ( 02 Jan 2024 22:47 )  30 ng/L / x     / x     / x     / x     / x      CARDIAC MARKERS ( 02 Jan 2024 20:18 )  30 ng/L / x     / x     / x     / x     / x                -------------------------------------------------------------------------------------------  Meds:  acetaminophen     Tablet .. 650 milliGRAM(s) Oral every 6 hours PRN  acetaminophen     Tablet .. 975 milliGRAM(s) Oral every 6 hours PRN  albuterol/ipratropium for Nebulization. 3 milliLiter(s) Nebulizer once  bisacodyl 5 milliGRAM(s) Oral at bedtime  pantoprazole    Tablet 40 milliGRAM(s) Oral before breakfast  tamsulosin 0.4 milliGRAM(s) Oral at bedtime    -------------------------------------------------------------------------------------------  Cardiovascular Diagnostic Testing:      -------------------------------------------------------------------------------------------  Assessment and Plan:   84 y/o M with PMH of HTN, HLD, CAD, ?pulmonary sarcoid, presenting with 2 weeks of intermittent dizziness on exertion, noted to be in 2:1 AVB for which EP is consulted.     #2:1 AV block  Pt p/w dizziness found to be in 2:1 AV block. 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 Lactate reassuringly normal. Likely that block is 2/2 to age related conduction disease, however unclear if pt has sarcoid given underlying pulm sarcoid. Regardless, given patient is symptomatic, PPM is indicated. Hospital course c/b delirium, pt received haldol found to have slight prolonged QTc following, now normalized.    - s/p PPM implant 1/3/23 ([employer] 2272)  - Post op pacemaker instructions reviewed with patient  - ID and booklet given to patient and taken home by family  - Pending f/uf appt with EP CLinic    *** Recommendations are preliminary until cosigned by the attending.    Cayden Aranda MD  Cardiology Fellow    For all New Consults and Questions:  [email or web address]   Login: [facility] Cardiology Progress Note  ------------------------------------------------------------------------------------------    SUBJECTIVE/EVENTS:  - Tele: No events  - NAEO, QTc now <500    -------------------------------------------------------------------------------------------  ROS:  CV: chest pain (-), palpitation (-), orthopnea (-), PND (-), edema (-)  PULM: SOB (-), cough (-), wheezing (-), hemoptysis (-).   CONST: fever (-), chills (-) or fatigue (-)  GI: abdominal distension (-), abdominal pain (-) , nausea/vomiting (-), hematemesis, (-), melena (-), hematochezia (-)  : dysuria (-), frequency (-), hematuria (-).   NEURO: numbness (-), weakness (-), dizziness (-)  MSK: myalgia (-), joint pain (-)   SKIN: itching (-), rash (-)  HEENT:  visual changes (-); vertigo or throat pain (-);  neck stiffness (-)   Psych: change in mood (-), anxiety (-), depression (-)     All other review of systems is negative unless indicated above.   -------------------------------------------------------------------------------------------  VITALS:  T(F): 97.4 (01-05), Max: 98.2 (01-05)  HR: 80 (01-05) (76 - 82)  BP: 140/78 (01-05) (140/78 - 165/85)  RR: 17 (01-05)  SpO2: 97% (01-05)  I&O's Summary    04 Jan 2024 07:01  -  05 Jan 2024 07:00  --------------------------------------------------------  IN: 600 mL / OUT: 670 mL / NET: -70 mL      ------------------------------------------------------------------------------------------  PHYSICAL EXAM:  GEN: NAD, sitting in bed  HEENT: NCAT, EOMI  CV: RRR, Ns1/s2, no m/r/g, JVP not elevated  RESP: CTA B/l, no w/r/r  ABD: soft, nt, nd  EXT: warm, 2+ pulses, no edema  SKIN: no visible lesions, rashes  NEURO: AAOx3, no focal deficits  PSYCH: Calm, cooperative  -------------------------------------------------------------------------------------------  LABS:                          12.3   9.46  )-----------( 225      ( 05 Jan 2024 01:16 )             37.6     01-05    140  |  107  |  26<H>  ----------------------------<  87  4.1   |  23  |  1.27    Ca    8.8      05 Jan 2024 01:16  Phos  2.6     01-05  Mg     2.1     01-05        CARDIAC MARKERS ( 02 Jan 2024 22:47 )  30 ng/L / x     / x     / x     / x     / x      CARDIAC MARKERS ( 02 Jan 2024 20:18 )  30 ng/L / x     / x     / x     / x     / x                -------------------------------------------------------------------------------------------  Meds:  acetaminophen     Tablet .. 650 milliGRAM(s) Oral every 6 hours PRN  acetaminophen     Tablet .. 975 milliGRAM(s) Oral every 6 hours PRN  albuterol/ipratropium for Nebulization. 3 milliLiter(s) Nebulizer once  bisacodyl 5 milliGRAM(s) Oral at bedtime  pantoprazole    Tablet 40 milliGRAM(s) Oral before breakfast  tamsulosin 0.4 milliGRAM(s) Oral at bedtime    -------------------------------------------------------------------------------------------  Cardiovascular Diagnostic Testing:      -------------------------------------------------------------------------------------------  Assessment and Plan:   82 y/o M with PMH of HTN, HLD, CAD, ?pulmonary sarcoid, presenting with 2 weeks of intermittent dizziness on exertion, noted to be in 2:1 AVB for which EP is consulted.     #2:1 AV block  Pt p/w dizziness found to be in 2:1 AV block. 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 Lactate reassuringly normal. Likely that block is 2/2 to age related conduction disease, however unclear if pt has sarcoid given underlying pulm sarcoid. Regardless, given patient is symptomatic, PPM is indicated. Hospital course c/b delirium, pt received haldol found to have slight prolonged QTc following, now normalized.    - s/p PPM implant 1/3/23 ([employer] 2272)  - Post op pacemaker instructions reviewed with patient  - ID and booklet given to patient and taken home by family  - Pending f/uf appt with EP CLinic    *** Recommendations are preliminary until cosigned by the attending.    Cayden Aranda MD  Cardiology Fellow    For all New Consults and Questions:  [email or web address]   Login: [email or web address]

## 2024-01-05 NOTE — PROGRESS NOTE ADULT - PROVIDER SPECIALTY LIST ADULT
Cardiology
Electrophysiology
[de-identified] : Monovisc Injection\par Right knee joint\par Lot.No: [id number]\par Exp.Date: 2024-06-30\par \par Procedure: Knee joint injection\par Laterality: right\par Indication: Osteoarthritis - discussed with patient\par Skin prep: alcohol and chlorhexidine\par Anesthesia: ethyl chloride spray\par Needle: 20-gauge\par Portal: superolateral\par Aspiration: none\par Injectate: Monovisc as above\par Dressing: Band-aid\par Complications: None\par \par RTC 6mo or earlier as needed with repeat bilateral knee XRs
Cardiology
Internal Medicine
Electrophysiology
Electrophysiology
Internal Medicine
Internal Medicine

## 2024-01-05 NOTE — PROGRESS NOTE ADULT - REASON FOR ADMISSION
symptomatic bradycardia, 2:1 AV block

## 2024-01-05 NOTE — DISCHARGE NOTE NURSING/CASE MANAGEMENT/SOCIAL WORK - NSDCFUADDAPPT_GEN_ALL_CORE_FT
Please call the [facility] Cardiology Office, [phone], to confirm the date and time for your outpatient follow up appointment and wound check. Please call the [facility] Cardiology Office, [phone], to confirm the date and time for your outpatient follow up appointment and wound check. Please call the [facility] Cardiology Office, [phone], to confirm the date and time for your outpatient follow up appointment and wound check.

## 2024-01-05 NOTE — PROGRESS NOTE ADULT - ASSESSMENT
Patient is a 83 year old male with PMHx of CAD c/b MI (2003 cath at [place], reportedly 80% stenosis but no stent), CKD3, HLD, HTN, Mild LV Diastolic Dysfunction, ?pulmonary sarcoid, prostate cancer s/p radiation, ?TIA/amnesia episodes, Chronic migraines, OA, incarcerated in [place] ([id number]). Presents to [facility] with symptomatic bradycardia. 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 AV Block:  - Prior EKGs showed NSR w/ RBBB and 1st degree AVB (not on BB)  - Holter monitor (2/2023) had shown "43 pauses >2 sec longest 2.2 sec; 2% isolated PVC;" TTE (2/15/23) showed LVEF 72%, mild (grade I) diastolic dysfunction, mild-mod AR; Lexiscan stress test (3/2023) was neg for ischemia  - Echo with nml lv fx, mild-mod AS   - s/p PPM placement 1/4  - EP/Cards following    # CAD/HLD/HTN:  - CV meds per Cards  - Resume ASA  - Monitor BP  - Cards following    # CKD3:  - Baseline 1.3 as per [facility]  - Bun/Cr 21/1.32 on admission  - Monitor I/O's  - Serial Cr  - Avoid nephrotoxins  - Renally dose medications  - Continue to monitor    # Hx of Prostate cancer:  - s/p radiation  - C/w home Tamsulosin   - + frequency, UA negative    # OA:  - Hx of OA and L meniscus issue, for which pt reported taking OTC generic aleve/NSAID daily    # Pulmonary sarcoidosis:  - Per outpatient pulm note (5/2023), pt was told he had pulm sarcoid prior to 2005 and his CT was suggestive of sarcoid. No hx of tissue biopsy ([facility] in setting of bradycardia); previous niox and spirometry testing was within normal.  - Outpatient Pulm follow-up    # Chronic constipation:  - C/w home PO Dulcolax    # DVT ppx:  - IPCs    [facility]  [phone]         Patient is a 83 year old male with PMHx of CAD c/b MI (2003 cath at [place], reportedly 80% stenosis but no stent), CKD3, HLD, HTN, Mild LV Diastolic Dysfunction, ?pulmonary sarcoid, prostate cancer s/p radiation, ?TIA/amnesia episodes, Chronic migraines, OA, incarcerated in [place] ([phone]). Presents to [facility] with symptomatic bradycardia. 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 AV Block:  - Prior EKGs showed NSR w/ RBBB and 1st degree AVB (not on BB)  - Holter monitor (2/2023) had shown "43 pauses >2 sec longest 2.2 sec; 2% isolated PVC;" TTE (2/15/23) showed LVEF 72%, mild (grade I) diastolic dysfunction, mild-mod AR; Lexiscan stress test (3/2023) was neg for ischemia  - Echo with nml lv fx, mild-mod AS   - s/p PPM placement 1/4  - EP/Cards following    # CAD/HLD/HTN:  - CV meds per Cards  - Resume ASA  - Monitor BP  - Cards following    # CKD3:  - Baseline 1.3 as per [facility]  - Bun/Cr 21/1.32 on admission  - Monitor I/O's  - Serial Cr  - Avoid nephrotoxins  - Renally dose medications  - Continue to monitor    # Hx of Prostate cancer:  - s/p radiation  - C/w home Tamsulosin   - + frequency, UA negative    # OA:  - Hx of OA and L meniscus issue, for which pt reported taking OTC generic aleve/NSAID daily    # Pulmonary sarcoidosis:  - Per outpatient pulm note (5/2023), pt was told he had pulm sarcoid prior to 2005 and his CT was suggestive of sarcoid. No hx of tissue biopsy ([facility] in setting of bradycardia); previous niox and spirometry testing was within normal.  - Outpatient Pulm follow-up    # Chronic constipation:  - C/w home PO Dulcolax    # DVT ppx:  - IPCs    [facility]  [phone]         Patient is a 83 year old male with PMHx of CAD c/b MI (2003 cath at [place], reportedly 80% stenosis but no stent), CKD3, HLD, HTN, Mild LV Diastolic Dysfunction, ?pulmonary sarcoid, prostate cancer s/p radiation, ?TIA/amnesia episodes, Chronic migraines, OA, incarcerated in [place] ([phone]). Presents to [facility] with symptomatic bradycardia. 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 AV Block:  - Prior EKGs showed NSR w/ RBBB and 1st degree AVB (not on BB)  - Holter monitor (2/2023) had shown "43 pauses >2 sec longest 2.2 sec; 2% isolated PVC;" TTE (2/15/23) showed LVEF 72%, mild (grade I) diastolic dysfunction, mild-mod AR; Lexiscan stress test (3/2023) was neg for ischemia  - Echo with nml lv fx, mild-mod AS   - s/p PPM placement 1/4  - EP/Cards following    # CAD/HLD/HTN:  - CV meds per Cards  - Resume ASA  - Monitor BP  - Cards following    # CKD3:  - Baseline 1.3 as per [facility]  - Bun/Cr 21/1.32 on admission  - Monitor I/O's  - Serial Cr  - Avoid nephrotoxins  - Renally dose medications  - Continue to monitor    # Hx of Prostate cancer:  - s/p radiation  - C/w home Tamsulosin   - + frequency, UA negative    # OA:  - Hx of OA and L meniscus issue, for which pt reported taking OTC generic aleve/NSAID daily    # Pulmonary sarcoidosis:  - Per outpatient pulm note (5/2023), pt was told he had pulm sarcoid prior to 2005 and his CT was suggestive of sarcoid. No hx of tissue biopsy ([facility] in setting of bradycardia); previous niox and spirometry testing was within normal.  - Outpatient Pulm follow-up    # Chronic constipation:  - C/w home PO Dulcolax    # DVT ppx:  - IPCs    [facility]  [phone]

## 2024-01-05 NOTE — PROGRESS NOTE ADULT - SUBJECTIVE AND OBJECTIVE BOX
CARDIOLOGY FOLLOW UP - Dr. Rondon  DATE OF SERVICE: 1/5/24    CC no acute cv events       REVIEW OF SYSTEMS:  CONSTITUTIONAL: No fever, weight loss, or fatigue  RESPIRATORY: No cough, wheezing, chills or hemoptysis; No Shortness of Breath  CARDIOVASCULAR: No chest pain, palpitations, passing out, dizziness, or leg swelling  GASTROINTESTINAL: No abdominal or epigastric pain. No nausea, vomiting, or hematemesis; No diarrhea or constipation. No melena or hematochezia.  VASCULAR: No edema     PHYSICAL EXAM:  T(C): 36.3 (01-05-24 @ 07:01), Max: 36.8 (01-05-24 @ 04:57)  HR: 80 (01-05-24 @ 07:01) (76 - 82)  BP: 140/78 (01-05-24 @ 07:01) (140/78 - 165/85)  RR: 17 (01-05-24 @ 07:01) (16 - 17)  SpO2: 97% (01-05-24 @ 07:01) (95% - 97%)  Wt(kg): --  I&O's Summary    04 Jan 2024 07:01  -  05 Jan 2024 07:00  --------------------------------------------------------  IN: 600 mL / OUT: 670 mL / NET: -70 mL        Appearance: Normal	  Cardiovascular: Normal S1 S2,RRR, No JVD, No murmurs  Respiratory: Lungs clear to auscultation	  Gastrointestinal:  Soft, Non-tender, + BS	  Extremities: Normal range of motion, No clubbing, cyanosis or edema  left acw CDI no hematoma     Home Medications:  acetaminophen 325 mg oral tablet: 2 tab(s) orally every 6 hours As needed Mild Pain (1 - 3) (04 Jan 2024 03:52)  aspirin 81 mg oral tablet: 1 tab(s) orally once a day (03 Jan 2024 01:17)  Dulcolax Laxative 5 mg oral tablet: 1 tab(s) orally once a day (at bedtime) (03 Jan 2024 01:17)  losartan 100 mg oral tablet: 1 tab(s) orally once a day (03 Jan 2024 01:17)  OTC generic aleve/NSAID: Takes 1 tab daily (03 Jan 2024 01:17)  Protonix 40 mg oral delayed release tablet: 1 tab(s) orally once a day (03 Jan 2024 01:17)  tamsulosin 0.4 mg oral capsule: 1 cap(s) orally once a day (at bedtime) (03 Jan 2024 01:17)      MEDICATIONS  (STANDING):  albuterol/ipratropium for Nebulization. 3 milliLiter(s) Nebulizer once  bisacodyl 5 milliGRAM(s) Oral at bedtime  pantoprazole    Tablet 40 milliGRAM(s) Oral before breakfast  tamsulosin 0.4 milliGRAM(s) Oral at bedtime      TELEMETRY: paced 	    ECG:  	  RADIOLOGY:   DIAGNOSTIC TESTING:  [ ] Echocardiogram:  [ ]  Catheterization:  [ ] Stress Test:    OTHER: 	    LABS:	 	    Troponin T, High Sensitivity Result: 30 ng/L [0 - 51] (01-02 @ 22:47)  Troponin T, High Sensitivity Result: 30 ng/L [0 - 51] (01-02 @ 20:18)                          12.3   9.46  )-----------( 225      ( 05 Jan 2024 01:16 )             37.6     01-05    140  |  107  |  26<H>  ----------------------------<  87  4.1   |  23  |  1.27    Ca    8.8      05 Jan 2024 01:16  Phos  2.6     01-05  Mg     2.1     01-05

## 2024-01-05 NOTE — DISCHARGE NOTE NURSING/CASE MANAGEMENT/SOCIAL WORK - NSDCPEFALRISK_GEN_ALL_CORE
For information on Fall & Injury Prevention, visit: [email or web address]-[email or web address] OR  [email or web address]://www.[email or web address]/[email or web address]-[email or web address]-[email or web address]-injury OR  https://www.cdc.gov/steadi/patient.html For information on Fall & Injury Prevention, visit: [email or web address]://www.[facility]/news/[email or web address]-[email or web address]-health-[email or web address]-mobility OR  https://www.[facility]/news/fall-prevention-tips-to-avoid-injury OR  https://www.cdc.gov/steadi/patient.html For information on Fall & Injury Prevention, visit: https://www.[facility]/news/fall-prevention-protects-and-maintains-health-and-mobility OR  https://www.[facility]/news/fall-prevention-tips-to-avoid-injury OR  https://www.cdc.gov/steadi/patient.html

## 2024-01-05 NOTE — PROGRESS NOTE ADULT - TIME BILLING
Agree with above ACP note.  cv stable  cont current tx  dcp
Agree with above PA note.  cv stable  cont current tx  s/p PPM  dcp

## 2024-01-05 NOTE — PROGRESS NOTE ADULT - SUBJECTIVE AND OBJECTIVE BOX
SUBJECTIVE / OVERNIGHT EVENTS:    patient seen and examined  resting comfortably in bed  no c/o at this time    --------------------------------------------------------------------------------------------  LABS:                        12.3   9.46  )-----------( 225      ( 05 Jan 2024 01:16 )             37.6     01-05    140  |  107  |  26<H>  ----------------------------<  87  4.1   |  23  |  1.27    Ca    8.8      05 Jan 2024 01:16  Phos  2.6     01-05  Mg     2.1     01-05        CAPILLARY BLOOD GLUCOSE            Urinalysis Basic - ( 05 Jan 2024 01:16 )    Color: x / Appearance: x / SG: x / pH: x  Gluc: 87 mg/dL / Ketone: x  / Bili: x / Urobili: x   Blood: x / Protein: x / Nitrite: x   Leuk Esterase: x / RBC: x / WBC x   Sq Epi: x / Non Sq Epi: x / Bacteria: x        RADIOLOGY & ADDITIONAL TESTS:    Imaging Personally Reviewed:  [x] YES  [ ] NO    Consultant(s) Notes Reviewed:  [x] YES  [ ] NO    MEDICATIONS  (STANDING):  albuterol/ipratropium for Nebulization. 3 milliLiter(s) Nebulizer once  bisacodyl 5 milliGRAM(s) Oral at bedtime  pantoprazole    Tablet 40 milliGRAM(s) Oral before breakfast  tamsulosin 0.4 milliGRAM(s) Oral at bedtime    MEDICATIONS  (PRN):  acetaminophen     Tablet .. 975 milliGRAM(s) Oral every 6 hours PRN Moderate Pain (4 - 6)  acetaminophen     Tablet .. 650 milliGRAM(s) Oral every 6 hours PRN Mild Pain (1 - 3)      Care Discussed with Consultants/Other Providers [x] YES  [ ] NO    Vital Signs Last 24 Hrs  T(C): 36.3 (05 Jan 2024 07:01), Max: 36.8 (05 Jan 2024 04:57)  T(F): 97.4 (05 Jan 2024 07:01), Max: 98.2 (05 Jan 2024 04:57)  HR: 80 (05 Jan 2024 07:01) (76 - 82)  BP: 140/78 (05 Jan 2024 07:01) (140/78 - 165/85)  BP(mean): 99 (05 Jan 2024 04:57) (99 - 117)  RR: 17 (05 Jan 2024 07:01) (16 - 17)  SpO2: 97% (05 Jan 2024 07:01) (95% - 97%)    Parameters below as of 05 Jan 2024 07:01  Patient On (Oxygen Delivery Method): room air      I&O's Summary    04 Jan 2024 07:01  -  05 Jan 2024 07:00  --------------------------------------------------------  IN: 600 mL / OUT: 670 mL / NET: -70 mL    PHYSICAL EXAM:  GENERAL: NAD, well-developed, comfortable  HEAD:  Atraumatic, Normocephalic  EYES: EOMI, PERRLA, conjunctiva and sclera clear  NECK: Supple, No JVD  CHEST/LUNG: Clear to auscultation bilaterally; No wheeze, L ACW incision c/d/i, slight ecchymosis     HEART: Bradycardiac; No murmurs, rubs, or gallops  ABDOMEN: Soft, Nontender, Nondistended; Bowel sounds present  NEURO: AAOx3, no focal weakness, 5/5 b/l extremity strength, b/l knee no arthritis, no effusion   EXTREMITIES:  2+ Peripheral Pulses, No clubbing, cyanosis, or edema  SKIN: No rashes or lesions

## 2024-01-05 NOTE — PROGRESS NOTE ADULT - ASSESSMENT
A/p  83M w/ hx of HTN, HLD, CKD3, CAD c/b MI (2003 cath at [place], reportedly 80% stenosis but no stent), mild LV diastolic dysfunction, ?pulmonary sarcoid, prostate cancer s/p radiation, ?TIA/amnesia episodes, chronic migraines, OA, incarcerated in [place] ([id number]), presenting with intermittent dizziness/lightheadedness x2 wks and bradycardia to HR 30s earlier in the day.    #AV Block  -2:1 AV block   -EP following  -Echo with nml lv fx, mild-mod AS   -S/p PPM placement 1/4    #CAD, Hx MI  -Resume asa per EP    #HTN  -Resume losartan 100mg qd     DCP per Primary team    #Aortic Stenosis  -Mild-Mod on echo  -Euvolemic on exam   -med management     #CKD  -Trend Cr    #Pulmonary Sarcoid  -Pulm eval           A/p  83M w/ hx of HTN, HLD, CKD3, CAD c/b MI (2003 cath at [place], reportedly 80% stenosis but no stent), mild LV diastolic dysfunction, ?pulmonary sarcoid, prostate cancer s/p radiation, ?TIA/amnesia episodes, chronic migraines, OA, incarcerated in [place] ([id number]), presenting with intermittent dizziness/lightheadedness x2 wks and bradycardia to HR 30s earlier in the day.    #AV Block  -2:1 AV block   -EP following  -Echo with nml lv fx, mild-mod AS   -S/p PPM placement 1/4    #CAD, Hx MI  -Resume asa per EP    #HTN  -Resume losartan 100mg qd     DCP per Primary team    #Aortic Stenosis  -Mild-Mod on echo  -Euvolemic on exam   -med management     #CKD  -Trend Cr    #Pulmonary Sarcoid  -Pulm eval           A/p  83M w/ hx of HTN, HLD, CKD3, CAD c/b MI (2003 cath at [place], reportedly 80% stenosis but no stent), mild LV diastolic dysfunction, ?pulmonary sarcoid, prostate cancer s/p radiation, ?TIA/amnesia episodes, chronic migraines, OA, incarcerated in [place] ([id number]), presenting with intermittent dizziness/lightheadedness x2 wks and bradycardia to HR 30s earlier in the day.    #AV Block  -2:1 AV block   -EP following  -Echo with nml lv fx, mild-mod AS   -S/p PPM placement 1/4    #CAD, Hx MI  -Resume asa per EP    #HTN  -Resume losartan 100mg qd     DCP per Primary team    #Aortic Stenosis  -Mild-Mod on echo  -Euvolemic on exam   -med management     #CKD  -Trend Cr    #Pulmonary Sarcoid  -Pulm eval

## 2024-01-17 RX ORDER — ASPIRIN/CALCIUM CARB/MAGNESIUM 324 MG
1 TABLET ORAL
Refills: 0 | DISCHARGE

## 2024-01-17 RX ORDER — TAMSULOSIN HYDROCHLORIDE 0.4 MG/1
1 CAPSULE ORAL
Refills: 0 | DISCHARGE

## 2024-01-17 RX ORDER — PANTOPRAZOLE SODIUM 20 MG/1
1 TABLET, DELAYED RELEASE ORAL
Refills: 0 | DISCHARGE

## 2024-01-17 RX ORDER — LOSARTAN POTASSIUM 100 MG/1
1 TABLET, FILM COATED ORAL
Refills: 0 | DISCHARGE

## 2024-01-25 ENCOUNTER — APPOINTMENT (OUTPATIENT)
Dept: ELECTROPHYSIOLOGY | Facility: CLINIC | Age: 84
End: 2024-01-25

## 2024-01-31 ENCOUNTER — APPOINTMENT (OUTPATIENT)
Dept: PULMONOLOGY | Facility: CLINIC | Age: 84
End: 2024-01-31
Payer: MEDICARE

## 2024-01-31 VITALS
BODY MASS INDEX: 29.54 KG/M2 | SYSTOLIC BLOOD PRESSURE: 115 MMHG | HEART RATE: 90 BPM | DIASTOLIC BLOOD PRESSURE: 80 MMHG | WEIGHT: 211 LBS | HEIGHT: 71 IN | OXYGEN SATURATION: 99 % | TEMPERATURE: 97.2 F

## 2024-01-31 DIAGNOSIS — R05.9 COUGH, UNSPECIFIED: ICD-10-CM

## 2024-01-31 DIAGNOSIS — D86.9 SARCOIDOSIS, UNSPECIFIED: ICD-10-CM

## 2024-01-31 PROCEDURE — 94729 DIFFUSING CAPACITY: CPT

## 2024-01-31 PROCEDURE — ZZZZZ: CPT

## 2024-01-31 PROCEDURE — 99215 OFFICE O/P EST HI 40 MIN: CPT | Mod: 25

## 2024-01-31 PROCEDURE — 94727 GAS DIL/WSHOT DETER LNG VOL: CPT

## 2024-01-31 PROCEDURE — 94010 BREATHING CAPACITY TEST: CPT

## 2024-01-31 RX ORDER — OMEPRAZOLE 40 MG/1
40 CAPSULE, DELAYED RELEASE ORAL
Qty: 30 | Refills: 5 | Status: ACTIVE | COMMUNITY
Start: 2024-01-31 | End: 1900-01-01

## 2024-01-31 NOTE — DISCUSSION/SUMMARY
[FreeTextEntry1] : Sarcoidosis.  Likely stable.  Appears old.  May need to exclude cardiac sarcoid.  Cough hypersensitivity syndrome.  Possible etiologies include Upper airway cough syndrome.  Possible contribution. Laryngeal pharyngeal reflux disease.  Possible contribution. Cough variant asthma.  No definitive evidence. Nonasthmatic eosinophilic bronchitis. May be related to sarcoid.  Shortness of breath.  Do not see evidence of significant parenchymal lung or airways disease.  Cannot exclude cardiac component.  Cannot exclude component of anxiety.  No definitive evidence of airways disease.  Rule out sleep disordered breathing.

## 2024-01-31 NOTE — HISTORY OF PRESENT ILLNESS
[Never] : never [TextBox_4] : Had recent PPM Difficulty with SOB. Has sarcoidosis. Dx in 2003. Went for colonoscopy and sent to pulmonary. Had films and told sarcoid. Never treated.  Goes to sleep 730-8 PM wakes up and notes difficulty with SOB.  Goes to sit in chair. Resolves with short period of time. Presently feels cannot get full breath. Some predom. dry cough.  Predom. in AM. Previously took medication ran out. Was on Omeprazole.  Positive some wheezing.  Some nasal congestion. Denies PN Drip.   [facility] to [facility] sarcoid but deferred due to bradycardia and PM recommended. saw cardio- s/p tte, s/p ct chest at [facility] done last month, s/p stress test nuclear.  Going to new cardiologist.  snoring resolved    Positive dry eyes seeing optho. Aware of sarcoid.

## 2024-01-31 NOTE — PROCEDURE
[FreeTextEntry1] : 01/31/2024 Pulmonary function testing FEV1, FVC, and FEV1/FVC are within normal limits. TLC and subdivisions are normal. RV/TLC ratio is normal. Single breath diffusion capacity is normal.  No significant change in function compared to May 2023.   ct chest 2/2023 [facility] reviewed. ct chest 5/2023 [facility] reviewed. Of note is lymphadenopathy many calcified.  Pulmonary nodules.  No significant interstitial fibrotic disease.

## 2024-01-31 NOTE — ASSESSMENT
[FreeTextEntry1] : Discuss with cardiology sarcoid and recent PPM.  Consider pet imaging. Omeprazole 1/2 hour pre dinner.  Chest radiograph on return to office. Labs sent. Consider home sleep study presently refusing.  f/u 1-2 months or sooner on a as needed basis.  45 minutes spent in evaluation management and review of studies and discussion.

## 2024-01-31 NOTE — PHYSICAL EXAM
[No Acute Distress] : no acute distress [Well Nourished] : well nourished [Well Developed] : well developed [IV] : Mallampati Class: IV [No Resp Distress] : no resp distress [No Acc Muscle Use] : no acc muscle use [Clear to Auscultation Bilaterally] : clear to auscultation bilaterally [No Focal Deficits] : no focal deficits [Oriented x3] : oriented x3 [Normal Oropharynx] : normal oropharynx [Normal Appearance] : normal appearance [No Neck Mass] : no neck mass [Normal Rate/Rhythm] : normal rate/rhythm [Normal S1, S2] : normal s1, s2 [No Murmurs] : no murmurs [No Abnormalities] : no abnormalities [Benign] : benign [Normal Gait] : normal gait [No Clubbing] : no clubbing [No Cyanosis] : no cyanosis [No Edema] : no edema [FROM] : FROM [Normal Color/ Pigmentation] : normal color/ pigmentation [Normal Affect] : normal affect

## 2024-02-05 LAB — IL2 SERPL-MCNC: 1213.2 PG/ML

## 2024-02-15 PROBLEM — N18.30 CHRONIC KIDNEY DISEASE, STAGE 3 UNSPECIFIED: Chronic | Status: ACTIVE | Noted: 2024-01-03

## 2024-02-15 PROBLEM — D86.0 SARCOIDOSIS OF LUNG: Chronic | Status: ACTIVE | Noted: 2024-01-03

## 2024-02-15 PROBLEM — I10 ESSENTIAL (PRIMARY) HYPERTENSION: Chronic | Status: ACTIVE | Noted: 2024-01-03

## 2024-02-15 PROBLEM — I25.10 ATHEROSCLEROTIC HEART DISEASE OF NATIVE CORONARY ARTERY WITHOUT ANGINA PECTORIS: Chronic | Status: ACTIVE | Noted: 2024-01-03

## 2024-02-15 PROBLEM — I25.2 OLD MYOCARDIAL INFARCTION: Chronic | Status: ACTIVE | Noted: 2024-01-03

## 2024-02-20 PROBLEM — C61 MALIGNANT NEOPLASM OF PROSTATE: Chronic | Status: ACTIVE | Noted: 2024-01-03

## 2024-02-20 PROBLEM — R41.3 OTHER AMNESIA: Chronic | Status: ACTIVE | Noted: 2024-01-03

## 2024-02-20 PROBLEM — Z86.73 PERSONAL HISTORY OF TRANSIENT ISCHEMIC ATTACK (TIA), AND CEREBRAL INFARCTION WITHOUT RESIDUAL DEFICITS: Chronic | Status: ACTIVE | Noted: 2024-01-03

## 2024-02-20 PROBLEM — K59.09 OTHER CONSTIPATION: Chronic | Status: ACTIVE | Noted: 2024-01-03

## 2024-02-20 PROBLEM — E78.5 HYPERLIPIDEMIA, UNSPECIFIED: Chronic | Status: ACTIVE | Noted: 2024-01-03

## 2024-02-20 PROBLEM — Z78.9 OTHER SPECIFIED HEALTH STATUS: Chronic | Status: ACTIVE | Noted: 2024-01-03

## 2024-03-06 ENCOUNTER — APPOINTMENT (OUTPATIENT)
Dept: PULMONOLOGY | Facility: CLINIC | Age: 84
End: 2024-03-06

## 2024-05-08 ENCOUNTER — APPOINTMENT (OUTPATIENT)
Dept: GASTROENTEROLOGY | Facility: CLINIC | Age: 84
End: 2024-05-08

## 2024-07-25 ENCOUNTER — NON-APPOINTMENT (OUTPATIENT)
Age: 84
End: 2024-07-25

## 2024-07-25 ENCOUNTER — APPOINTMENT (OUTPATIENT)
Dept: ELECTROPHYSIOLOGY | Facility: CLINIC | Age: 84
End: 2024-07-25

## 2024-07-25 PROCEDURE — 93294 REM INTERROG EVL PM/LDLS PM: CPT

## 2024-07-25 PROCEDURE — 93296 REM INTERROG EVL PM/IDS: CPT

## 2024-10-02 ENCOUNTER — APPOINTMENT (OUTPATIENT)
Dept: UROLOGY | Facility: CLINIC | Age: 84
End: 2024-10-02
Payer: MEDICARE

## 2024-10-02 ENCOUNTER — NON-APPOINTMENT (OUTPATIENT)
Age: 84
End: 2024-10-02

## 2024-10-02 VITALS
HEIGHT: 71 IN | SYSTOLIC BLOOD PRESSURE: 104 MMHG | DIASTOLIC BLOOD PRESSURE: 68 MMHG | WEIGHT: 196 LBS | HEART RATE: 89 BPM | RESPIRATION RATE: 17 BRPM | BODY MASS INDEX: 27.44 KG/M2

## 2024-10-02 DIAGNOSIS — Z80.42 FAMILY HISTORY OF MALIGNANT NEOPLASM OF PROSTATE: ICD-10-CM

## 2024-10-02 DIAGNOSIS — Z80.1 FAMILY HISTORY OF MALIGNANT NEOPLASM OF TRACHEA, BRONCHUS AND LUNG: ICD-10-CM

## 2024-10-02 PROCEDURE — 99204 OFFICE O/P NEW MOD 45 MIN: CPT

## 2024-10-02 RX ORDER — TAMSULOSIN HCL 0.4 MG
CAPSULE ORAL
Refills: 0 | Status: ACTIVE | COMMUNITY

## 2024-10-02 RX ORDER — TAMSULOSIN HYDROCHLORIDE 0.4 MG/1
0.4 CAPSULE ORAL
Qty: 180 | Refills: 3 | Status: ACTIVE | COMMUNITY
Start: 2024-10-02 | End: 1900-01-01

## 2024-10-03 LAB
BUN SERPL-MCNC: 22 MG/DL
CREAT SERPL-MCNC: 1.26 MG/DL
EGFR: 56 ML/MIN/1.73M2

## 2024-10-07 ENCOUNTER — APPOINTMENT (OUTPATIENT)
Dept: UROLOGY | Facility: CLINIC | Age: 84
End: 2024-10-07

## 2024-10-07 LAB — PSA SERPL-MCNC: 1.4 NG/ML

## 2024-10-07 NOTE — LETTER BODY
[Dear  ___] : Dear  [unfilled], [Consult Letter:] : I had the pleasure of evaluating your patient, [unfilled]. [Please see my note below.] : Please see my note below. [Consult Closing:] : Thank you very much for allowing me to participate in the care of this patient.  If you have any questions, please do not hesitate to contact me. [FreeTextEntry1] : Please see my note. I will keep you informed. [FreeTextEntry3] : Sincerely,  Asya Flynn MD Clinical  [facility]

## 2024-10-07 NOTE — HISTORY OF PRESENT ILLNESS
[FreeTextEntry1] : 10/02/2024: Mr. CAICEDO is an 84-year-old male with h/o prostate cancer dx'ed 17 years ago in [place], treated w XRT (15 treatements)  Presents today s/p 9/13/24 Left Total Hip replacement at [facility] Catheter was removed 4 days later at the rehab, and pt experienced onset AUR  Pedroza catheter was subsequently re-inserted. Of note, second catheter insertion was difficult and pt sustained trauma (Gh with clots afterwards).  Pt was reportedly found with UTI from specimen after first pedroza catheter removal.   He has been Taking tamsulosin 0.4 mg BID (one in the morning and one at bedtime) for years now, prescribed by a urologist.   No h/o GH besides recent trauma with catheter insertion.   Up to date with colonoscopy. No blood in stool.   Never a smoker  No fhx breast, ovarian, uterine cancer

## 2024-10-07 NOTE — HISTORY OF PRESENT ILLNESS
[FreeTextEntry1] : 10/02/2024: Mr. CAICEDO is an 84-year-old male with h/o prostate cancer dx'ed 17 years ago in [place], treated w XRT (15 treatements)  Presents today s/p 9/13/24 Left Total Hip replacement at [facility] Catheter was removed 4 days later at the rehab, and pt experienced onset AUR  Pedroza catheter was subsequently re-inserted. Of note, second catheter insertion was difficult and pt sustained trauma (Gh with clots afterwards).  Pt was reportedly found with UTI from specimen after first pedroza catheter removal.   He has been Taking tamsulosin 0.4 mg BID (one in the morning and one at bedtime) for years now, prescribed by a urologist.   No h/o GH besides recent trauma with catheter insertion.   Up to date with colonoscopy. No blood in stool.   Never a smoker  No fhx breast, ovarian, uterine cancer

## 2024-10-07 NOTE — ASSESSMENT
[FreeTextEntry1] : Explained to patient post-operative urinary retention is not uncommon.   -Patient will continue Tamsulosin HCl 0.4 mg BID. I informed the patient of potential side of effects such as nasal congestion, light-headedness and decreased semen volume.  -Blood sent today for Cr/BUN, and PSA screen  Pt will have a Trial of Void.  F/U for TOV.

## 2024-10-07 NOTE — ADDENDUM
[FreeTextEntry1] : This note was partly authored by Cullen Sapp working as a scribe for REMY Batista. I, REMY Batista, have reviewed the content of this note and confirm it is true and accurate. I personally performed the history and physical examination and made all the decisions. 10/02/2024.

## 2024-10-07 NOTE — LETTER BODY
[Dear  ___] : Dear  [unfilled], [Consult Letter:] : I had the pleasure of evaluating your patient, [unfilled]. [Please see my note below.] : Please see my note below. [Consult Closing:] : Thank you very much for allowing me to participate in the care of this patient.  If you have any questions, please do not hesitate to contact me. [FreeTextEntry1] : Please see my note. I will keep you informed. [FreeTextEntry3] : Sincerely,  Asya Flynn MD Clinical  [facility]

## 2024-10-09 ENCOUNTER — APPOINTMENT (OUTPATIENT)
Dept: UROLOGY | Facility: CLINIC | Age: 84
End: 2024-10-09
Payer: MEDICARE

## 2024-10-09 VITALS
DIASTOLIC BLOOD PRESSURE: 78 MMHG | WEIGHT: 196 LBS | SYSTOLIC BLOOD PRESSURE: 126 MMHG | RESPIRATION RATE: 17 BRPM | OXYGEN SATURATION: 98 % | HEIGHT: 71 IN | BODY MASS INDEX: 27.44 KG/M2 | HEART RATE: 74 BPM

## 2024-10-09 DIAGNOSIS — T83.9XXA UNSPECIFIED COMPLICATION OF GENITOURINARY PROSTHETIC DEVICE, IMPLANT AND GRAFT, INITIAL ENCOUNTER: ICD-10-CM

## 2024-10-09 DIAGNOSIS — N48.89 OTHER SPECIFIED DISORDERS OF PENIS: ICD-10-CM

## 2024-10-09 DIAGNOSIS — C61 MALIGNANT NEOPLASM OF PROSTATE: ICD-10-CM

## 2024-10-09 DIAGNOSIS — R33.9 RETENTION OF URINE, UNSPECIFIED: ICD-10-CM

## 2024-10-09 PROCEDURE — 99214 OFFICE O/P EST MOD 30 MIN: CPT | Mod: 25

## 2024-10-09 PROCEDURE — 51703 INSERT BLADDER CATH COMPLEX: CPT

## 2024-10-10 ENCOUNTER — APPOINTMENT (OUTPATIENT)
Dept: UROLOGY | Facility: CLINIC | Age: 84
End: 2024-10-10

## 2024-10-10 PROCEDURE — 99212 OFFICE O/P EST SF 10 MIN: CPT

## 2024-10-11 RX ORDER — FINASTERIDE 5 MG/1
5 TABLET, FILM COATED ORAL
Qty: 90 | Refills: 0 | Status: ACTIVE | COMMUNITY
Start: 2024-10-09 | End: 1900-01-01

## 2024-10-16 ENCOUNTER — APPOINTMENT (OUTPATIENT)
Dept: UROLOGY | Facility: CLINIC | Age: 84
End: 2024-10-16

## 2024-10-23 ENCOUNTER — APPOINTMENT (OUTPATIENT)
Dept: UROLOGY | Facility: CLINIC | Age: 84
End: 2024-10-23

## 2024-10-23 VITALS
BODY MASS INDEX: 38.14 KG/M2 | WEIGHT: 202 LBS | DIASTOLIC BLOOD PRESSURE: 75 MMHG | HEIGHT: 61 IN | HEART RATE: 90 BPM | TEMPERATURE: 98.3 F | OXYGEN SATURATION: 95 % | SYSTOLIC BLOOD PRESSURE: 117 MMHG

## 2024-10-23 DIAGNOSIS — R33.9 RETENTION OF URINE, UNSPECIFIED: ICD-10-CM

## 2024-10-23 DIAGNOSIS — I11.9 HYPERTENSIVE HEART DISEASE W/OUT HEART FAILURE: ICD-10-CM

## 2024-10-23 DIAGNOSIS — R31.0 GROSS HEMATURIA: ICD-10-CM

## 2024-10-23 DIAGNOSIS — I43 HYPERTENSIVE HEART DISEASE W/OUT HEART FAILURE: ICD-10-CM

## 2024-10-23 PROCEDURE — 51700 IRRIGATION OF BLADDER: CPT

## 2024-10-23 PROCEDURE — A4216: CPT | Mod: NC

## 2024-10-24 ENCOUNTER — EMERGENCY (EMERGENCY)
Facility: HOSPITAL | Age: 84
LOS: 0 days | Discharge: HOME | End: 2024-10-24
Attending: EMERGENCY MEDICINE
Payer: MEDICARE

## 2024-10-24 VITALS
DIASTOLIC BLOOD PRESSURE: 73 MMHG | RESPIRATION RATE: 18 BRPM | TEMPERATURE: 98 F | WEIGHT: 203.93 LBS | HEIGHT: 72 IN | SYSTOLIC BLOOD PRESSURE: 116 MMHG | OXYGEN SATURATION: 96 % | HEART RATE: 80 BPM

## 2024-10-24 VITALS
TEMPERATURE: 98 F | OXYGEN SATURATION: 98 % | HEART RATE: 78 BPM | SYSTOLIC BLOOD PRESSURE: 121 MMHG | RESPIRATION RATE: 15 BRPM | DIASTOLIC BLOOD PRESSURE: 71 MMHG

## 2024-10-24 DIAGNOSIS — Z98.890 OTHER SPECIFIED POSTPROCEDURAL STATES: Chronic | ICD-10-CM

## 2024-10-24 LAB
APPEARANCE UR: ABNORMAL
BACTERIA # UR AUTO: ABNORMAL /HPF
BILIRUB UR-MCNC: NEGATIVE — SIGNIFICANT CHANGE UP
COLOR SPEC: YELLOW — SIGNIFICANT CHANGE UP
COMMENT - URINE: SIGNIFICANT CHANGE UP
DIFF PNL FLD: ABNORMAL
GLUCOSE UR QL: NEGATIVE MG/DL — SIGNIFICANT CHANGE UP
KETONES UR-MCNC: NEGATIVE MG/DL — SIGNIFICANT CHANGE UP
LEUKOCYTE ESTERASE UR-ACNC: ABNORMAL
NITRITE UR-MCNC: NEGATIVE — SIGNIFICANT CHANGE UP
PH UR: 8 — SIGNIFICANT CHANGE UP (ref 5–8)
PROT UR-MCNC: 100 MG/DL
RBC CASTS # UR COMP ASSIST: 0 /HPF — SIGNIFICANT CHANGE UP (ref 0–4)
SP GR SPEC: 1.02 — SIGNIFICANT CHANGE UP (ref 1–1.03)
UROBILINOGEN FLD QL: 1 MG/DL — SIGNIFICANT CHANGE UP (ref 0.2–1)
WBC UR QL: 10 /HPF — HIGH (ref 0–5)

## 2024-10-24 PROCEDURE — 99284 EMERGENCY DEPT VISIT MOD MDM: CPT

## 2024-10-24 PROCEDURE — 93970 EXTREMITY STUDY: CPT | Mod: 26

## 2024-10-24 RX ORDER — CEPHALEXIN 500 MG
1 CAPSULE ORAL
Qty: 28 | Refills: 0
Start: 2024-10-24 | End: 2024-10-30

## 2024-10-24 RX ORDER — CEPHALEXIN 500 MG
500 CAPSULE ORAL ONCE
Refills: 0 | Status: COMPLETED | OUTPATIENT
Start: 2024-10-24 | End: 2024-10-24

## 2024-10-24 RX ORDER — NYSTATIN 100000 U/G
1 POWDER TOPICAL
Refills: 0 | Status: DISCONTINUED | OUTPATIENT
Start: 2024-10-24 | End: 2024-10-24

## 2024-10-24 RX ORDER — NYSTATIN 100000 U/G
1 POWDER TOPICAL
Qty: 1 | Refills: 0
Start: 2024-10-24

## 2024-10-24 RX ADMIN — Medication 500 MILLIGRAM(S): at 06:00

## 2024-10-24 RX ADMIN — NYSTATIN 1 APPLICATION(S): 100000 POWDER TOPICAL at 06:00

## 2024-10-24 NOTE — ED PROVIDER NOTE - WET READ LAUNCH FT
PROCEDURE: Abdomen Limited

 

INDICATIONS:  Abdominal pain

 

TECHNIQUE:  

Real-time focused scanning was performed of the abdomen, with image documentation.  

 

COMPARISON:  None

 

FINDINGS:  Increased hepatic parenchymal echogenicity indicative of diffuse hepatic steatosis, at pj
st moderate in severity. No focal hepatic mass. No intrahepatic or extrahepatic biliary ductal dilata
tion. Multiple gallstones measuring at least 1 cm. No gallbladder wall thickening or pericholecystic 
fluid. No sonographic Vasquez's sign was elicited (although patient was medicated prior to the study).
 Visualized portions of the pancreas are within normal limits. The right kidney is unremarkable.

 

IMPRESSION:  

Cholelithiasis and hepatic steatosis. No significant change from preliminary report.

 

Reviewed by: Noe Casas MD on 3/18/2021 9:14 AM PDT

Approved by: Noe Casas MD on 3/18/2021 9:14 AM PDT

 

 

Station ID:  [id number] There are no Wet Read(s) to document.

## 2024-10-24 NOTE — ED PROVIDER NOTE - PATIENT PORTAL LINK FT
You can access the FollowMyHealth Patient Portal offered by [facility] by registering at the following website: [email or web address]. By joining [employer]’s FollowMyHealth portal, you will also be able to view your health information using other applications (apps) compatible with our system.

## 2024-10-24 NOTE — ED ADULT TRIAGE NOTE - CHIEF COMPLAINT QUOTE
BIBA,  pt states he had a pedroza catheter replaced today in MD's office.  woke up and water was all over him,  leg bag empty of urine at this time.  pt unsure if pedroza came completely out.

## 2024-10-24 NOTE — ED PROVIDER NOTE - CARE PROVIDER_API CALL
Asya Flynn.  Urology  [address] [place] [id number]  Phone: [phone]  Fax: [phone]  Follow Up Time:

## 2024-10-24 NOTE — ED PROVIDER NOTE - NSFOLLOWUPINSTRUCTIONS_ED_ALL_ED_FT
1) Take prescription medication as instructed  2) Follow-up with your urologist  3) Follow up with your primary care doctor  4) Return to the ER for worsening or concerning symptoms

## 2024-10-24 NOTE — ED PROVIDER NOTE - OBJECTIVE STATEMENT
PROCEDURE NOTE: A/C Paracentesis, Diagnostic OS. Diagnosis: Panuveitis. Anesthesia: Topical. Prep: Betadine Drops. Prior to procedure, risks/benefits/alternatives discussed including infection, loss of vision, hemorrhage, cataract, retinal tears or detachment and patient wished to proceed. All questions asked by the patient were answered in detail. Betadine prep was performed. Location of Paracentesis: Temporal Limbus. Volume of tap: 0.* ml. IOP after procedure was * mmHg. Patient tolerated procedure well. There were no complications. Post procedure instructions given. Patient given office phone number/answering service number and advised to call immediately should there be an increase in floaters or redness, loss of vision or pain, or should they have any other questions or concerns. Marco Tinajero
This patient is an 84 year old man hx of HTN, HLD, CAD who presents to the ER reporting pedroza catheter problem.  The pedroza was first placed about 1 month ago after hip surgery.  It was replaced earlier today at his urologist office.  This evening he reports waking up with urine soaked pants and assumed the catheter was leaking.  No fever or hematuria.  Patient also reporting swelling to his bilateral legs for which his PMD has given him 4 days of lasix.

## 2024-10-24 NOTE — ED PROVIDER NOTE - CLINICAL SUMMARY MEDICAL DECISION MAKING FREE TEXT BOX
Patient reporting pedroza catheter issue however pedroza in place on exam.   Possible UTI will check UA.  Clinical suspicion for candida dermatitis noted on exam as well as erythema of right leg with bilateral edema.  Likely cellulitis consider possible DVT.      Patient given nystatin cream  US negative for DVT.  UA + for UTI abx given

## 2024-10-24 NOTE — ED PROVIDER NOTE - CARE PLAN
1 Principal Discharge DX:	UTI (urinary tract infection)  Secondary Diagnosis:	Cellulitis  Secondary Diagnosis:	Salas catheter problem   Principal Discharge DX:	UTI (urinary tract infection)  Secondary Diagnosis:	Cellulitis  Secondary Diagnosis:	Salas catheter problem  Secondary Diagnosis:	Candidal dermatitis

## 2024-10-24 NOTE — ED ADULT NURSE NOTE - NSFALLHARMRISKINTERV_ED_ALL_ED

## 2024-10-24 NOTE — ED ADULT NURSE NOTE - OBJECTIVE STATEMENT
Patient presents to ED c/o pedroza catheter complication, reports having replacement today in MD's office but waking up all wet,  leg bag noted with shankar urine. BLLE swelling noted +3 that started about 1.5 weeks ago, on lasix. PMH: Left hip repair, pacemaker, MI, Prostate CA, Patient presents to ED c/o pedroza catheter complication, reports having pedroza replaced today in MD's office but was all wet upon waking up. Leg bag noted with shankar-colored urine. Patient reporting sharp LLQ pain 4/10. BLLE swelling noted +3 that patient reports started about 1.5 weeks ago and is now on lasix. PMH: Left hip repair, pacemaker, MI, Prostate CA, Patient presents to ED c/o pedroza catheter complication, reports having pedroza replaced today in MD's office but was all wet upon waking up. Leg bag noted with shankar-colored urine. Patient reporting sharp LLQ pain 4/10. BLLE swelling noted +3 that patient reports started about 1.5 weeks ago and is now on lasix. PMH: Left hip repair, pacemaker, MI, Prostate CA, Stage 3 CKD, TIA, HTN, HLD, CAD

## 2024-10-27 LAB
-  AMPICILLIN: SIGNIFICANT CHANGE UP
-  CIPROFLOXACIN: SIGNIFICANT CHANGE UP
-  LEVOFLOXACIN: SIGNIFICANT CHANGE UP
-  NITROFURANTOIN: SIGNIFICANT CHANGE UP
-  TETRACYCLINE: SIGNIFICANT CHANGE UP
-  VANCOMYCIN: SIGNIFICANT CHANGE UP
CULTURE RESULTS: ABNORMAL
METHOD TYPE: SIGNIFICANT CHANGE UP
ORGANISM # SPEC MICROSCOPIC CNT: ABNORMAL
ORGANISM # SPEC MICROSCOPIC CNT: SIGNIFICANT CHANGE UP
SPECIMEN SOURCE: SIGNIFICANT CHANGE UP

## 2024-10-28 ENCOUNTER — APPOINTMENT (OUTPATIENT)
Dept: ELECTROPHYSIOLOGY | Facility: CLINIC | Age: 84
End: 2024-10-28

## 2024-10-28 PROCEDURE — 93296 REM INTERROG EVL PM/IDS: CPT

## 2024-10-28 PROCEDURE — 93294 REM INTERROG EVL PM/LDLS PM: CPT

## 2024-10-29 ENCOUNTER — NON-APPOINTMENT (OUTPATIENT)
Age: 84
End: 2024-10-29

## 2024-11-20 ENCOUNTER — APPOINTMENT (OUTPATIENT)
Dept: UROLOGY | Facility: CLINIC | Age: 84
End: 2024-11-20

## 2024-11-20 ENCOUNTER — OUTPATIENT (OUTPATIENT)
Dept: OUTPATIENT SERVICES | Facility: HOSPITAL | Age: 84
LOS: 1 days | End: 2024-11-20
Payer: COMMERCIAL

## 2024-11-20 VITALS
DIASTOLIC BLOOD PRESSURE: 69 MMHG | RESPIRATION RATE: 16 BRPM | SYSTOLIC BLOOD PRESSURE: 106 MMHG | OXYGEN SATURATION: 96 % | HEART RATE: 100 BPM

## 2024-11-20 DIAGNOSIS — R33.9 RETENTION OF URINE, UNSPECIFIED: ICD-10-CM

## 2024-11-20 DIAGNOSIS — R35.0 FREQUENCY OF MICTURITION: ICD-10-CM

## 2024-11-20 DIAGNOSIS — Z98.890 OTHER SPECIFIED POSTPROCEDURAL STATES: Chronic | ICD-10-CM

## 2024-11-20 PROBLEM — B36.9 FUNGAL DERMATITIS: Status: ACTIVE | Noted: 2024-11-20

## 2024-11-20 PROCEDURE — 99212 OFFICE O/P EST SF 10 MIN: CPT | Mod: 25

## 2024-11-20 PROCEDURE — 51700 IRRIGATION OF BLADDER: CPT

## 2024-11-20 RX ORDER — NYSTATIN AND TRIAMCINOLONE ACETONIDE 100000; 1 MG/G; MG/G
100000-0.1 CREAM TOPICAL TWICE DAILY
Qty: 42.5 | Refills: 1 | Status: ACTIVE | COMMUNITY
Start: 2024-11-20 | End: 1900-01-01

## 2024-11-21 ENCOUNTER — APPOINTMENT (OUTPATIENT)
Dept: UROLOGY | Facility: CLINIC | Age: 84
End: 2024-11-21

## 2024-11-21 ENCOUNTER — OUTPATIENT (OUTPATIENT)
Dept: OUTPATIENT SERVICES | Facility: HOSPITAL | Age: 84
LOS: 1 days | End: 2024-11-21
Payer: MEDICARE

## 2024-11-21 DIAGNOSIS — B36.9 SUPERFICIAL MYCOSIS, UNSPECIFIED: ICD-10-CM

## 2024-11-21 DIAGNOSIS — Z98.890 OTHER SPECIFIED POSTPROCEDURAL STATES: Chronic | ICD-10-CM

## 2024-11-21 LAB
APPEARANCE: ABNORMAL
BACTERIA: ABNORMAL /HPF
BILIRUBIN URINE: NEGATIVE
BLOOD URINE: ABNORMAL
CAST: 0 /LPF
COLOR: YELLOW
EPITHELIAL CELLS: 0 /HPF
GLUCOSE QUALITATIVE U: NEGATIVE MG/DL
KETONES URINE: NEGATIVE MG/DL
LEUKOCYTE ESTERASE URINE: ABNORMAL
MICROSCOPIC-UA: NORMAL
NITRITE URINE: NEGATIVE
PH URINE: 6
PROTEIN URINE: NORMAL MG/DL
RED BLOOD CELLS URINE: 1 /HPF
REVIEW: NORMAL
SPECIFIC GRAVITY URINE: 1.01
UROBILINOGEN URINE: 0.2 MG/DL
WHITE BLOOD CELLS URINE: 128 /HPF

## 2024-11-21 PROCEDURE — 51702 INSERT TEMP BLADDER CATH: CPT

## 2024-11-21 PROCEDURE — 99213 OFFICE O/P EST LOW 20 MIN: CPT | Mod: 25

## 2024-11-25 LAB — BACTERIA UR CULT: NORMAL

## 2024-11-27 ENCOUNTER — APPOINTMENT (OUTPATIENT)
Dept: UROLOGY | Facility: CLINIC | Age: 84
End: 2024-11-27
Payer: MEDICARE

## 2024-11-27 VITALS
RESPIRATION RATE: 17 BRPM | WEIGHT: 205 LBS | SYSTOLIC BLOOD PRESSURE: 132 MMHG | HEART RATE: 93 BPM | DIASTOLIC BLOOD PRESSURE: 84 MMHG | BODY MASS INDEX: 32.95 KG/M2 | HEIGHT: 66 IN

## 2024-11-27 DIAGNOSIS — N50.9 DISORDER OF MALE GENITAL ORGANS, UNSPECIFIED: ICD-10-CM

## 2024-11-27 DIAGNOSIS — I25.2 OLD MYOCARDIAL INFARCTION: ICD-10-CM

## 2024-11-27 DIAGNOSIS — I43 HYPERTENSIVE HEART DISEASE W/OUT HEART FAILURE: ICD-10-CM

## 2024-11-27 DIAGNOSIS — N31.2 FLACCID NEUROPATHIC BLADDER, NOT ELSEWHERE CLASSIFIED: ICD-10-CM

## 2024-11-27 DIAGNOSIS — Z80.1 FAMILY HISTORY OF MALIGNANT NEOPLASM OF TRACHEA, BRONCHUS AND LUNG: ICD-10-CM

## 2024-11-27 DIAGNOSIS — I11.9 HYPERTENSIVE HEART DISEASE W/OUT HEART FAILURE: ICD-10-CM

## 2024-11-27 DIAGNOSIS — Z80.42 FAMILY HISTORY OF MALIGNANT NEOPLASM OF PROSTATE: ICD-10-CM

## 2024-11-27 PROCEDURE — 99214 OFFICE O/P EST MOD 30 MIN: CPT

## 2024-11-30 PROBLEM — N31.2 ATONIC BLADDER: Status: ACTIVE | Noted: 2024-11-30

## 2024-11-30 PROBLEM — N50.9 SCROTAL SKIN LESION: Status: ACTIVE | Noted: 2024-11-30

## 2024-12-02 ENCOUNTER — APPOINTMENT (OUTPATIENT)
Dept: UROLOGY | Facility: CLINIC | Age: 84
End: 2024-12-02
Payer: MEDICARE

## 2024-12-02 ENCOUNTER — OUTPATIENT (OUTPATIENT)
Dept: OUTPATIENT SERVICES | Facility: HOSPITAL | Age: 84
LOS: 1 days | End: 2024-12-02
Payer: MEDICARE

## 2024-12-02 ENCOUNTER — NON-APPOINTMENT (OUTPATIENT)
Age: 84
End: 2024-12-02

## 2024-12-02 DIAGNOSIS — B36.9 SUPERFICIAL MYCOSIS, UNSPECIFIED: ICD-10-CM

## 2024-12-02 DIAGNOSIS — C61 MALIGNANT NEOPLASM OF PROSTATE: ICD-10-CM

## 2024-12-02 DIAGNOSIS — Z98.890 OTHER SPECIFIED POSTPROCEDURAL STATES: Chronic | ICD-10-CM

## 2024-12-02 DIAGNOSIS — R33.9 RETENTION OF URINE, UNSPECIFIED: ICD-10-CM

## 2024-12-02 DIAGNOSIS — R31.0 GROSS HEMATURIA: ICD-10-CM

## 2024-12-02 PROBLEM — N39.0 ACUTE UTI: Status: ACTIVE | Noted: 2024-12-02 | Resolved: 2025-01-01

## 2024-12-02 LAB
BUN SERPL-MCNC: 19 MG/DL
CREAT SERPL-MCNC: 1.28 MG/DL
EGFR: 55 ML/MIN/1.73M2
PSA SERPL-MCNC: 0.27 NG/ML

## 2024-12-02 PROCEDURE — 51700 IRRIGATION OF BLADDER: CPT

## 2024-12-02 PROCEDURE — 99214 OFFICE O/P EST MOD 30 MIN: CPT | Mod: 25

## 2024-12-02 RX ORDER — CEFDINIR 300 MG/1
300 CAPSULE ORAL
Qty: 14 | Refills: 0 | Status: ACTIVE | COMMUNITY
Start: 2024-12-02 | End: 1900-01-01

## 2024-12-03 ENCOUNTER — NON-APPOINTMENT (OUTPATIENT)
Age: 84
End: 2024-12-03

## 2024-12-03 DIAGNOSIS — R31.0 GROSS HEMATURIA: ICD-10-CM

## 2024-12-03 DIAGNOSIS — C61 MALIGNANT NEOPLASM OF PROSTATE: ICD-10-CM

## 2024-12-03 DIAGNOSIS — B36.9 SUPERFICIAL MYCOSIS, UNSPECIFIED: ICD-10-CM

## 2024-12-03 DIAGNOSIS — T83.9XXA UNSPECIFIED COMPLICATION OF GENITOURINARY PROSTHETIC DEVICE, IMPLANT AND GRAFT, INITIAL ENCOUNTER: ICD-10-CM

## 2024-12-03 DIAGNOSIS — N39.0 URINARY TRACT INFECTION, SITE NOT SPECIFIED: ICD-10-CM

## 2024-12-04 RX ORDER — SULFAMETHOXAZOLE AND TRIMETHOPRIM 800; 160 MG/1; MG/1
800-160 TABLET ORAL TWICE DAILY
Qty: 14 | Refills: 0 | Status: ACTIVE | COMMUNITY
Start: 2024-12-03 | End: 1900-01-01

## 2024-12-11 ENCOUNTER — APPOINTMENT (OUTPATIENT)
Dept: UROLOGY | Facility: CLINIC | Age: 84
End: 2024-12-11
Payer: MEDICARE

## 2024-12-11 VITALS
RESPIRATION RATE: 17 BRPM | DIASTOLIC BLOOD PRESSURE: 65 MMHG | BODY MASS INDEX: 32.95 KG/M2 | HEIGHT: 66 IN | SYSTOLIC BLOOD PRESSURE: 110 MMHG | HEART RATE: 99 BPM | OXYGEN SATURATION: 97 % | WEIGHT: 205 LBS

## 2024-12-11 DIAGNOSIS — T83.9XXA UNSPECIFIED COMPLICATION OF GENITOURINARY PROSTHETIC DEVICE, IMPLANT AND GRAFT, INITIAL ENCOUNTER: ICD-10-CM

## 2024-12-11 DIAGNOSIS — N39.0 URINARY TRACT INFECTION, SITE NOT SPECIFIED: ICD-10-CM

## 2024-12-11 PROCEDURE — 99214 OFFICE O/P EST MOD 30 MIN: CPT

## 2025-01-02 ENCOUNTER — OUTPATIENT (OUTPATIENT)
Dept: OUTPATIENT SERVICES | Facility: HOSPITAL | Age: 85
LOS: 1 days | End: 2025-01-02
Payer: MEDICARE

## 2025-01-02 ENCOUNTER — APPOINTMENT (OUTPATIENT)
Dept: UROLOGY | Facility: CLINIC | Age: 85
End: 2025-01-02
Payer: MEDICARE

## 2025-01-02 ENCOUNTER — APPOINTMENT (OUTPATIENT)
Dept: UROLOGY | Facility: CLINIC | Age: 85
End: 2025-01-02

## 2025-01-02 VITALS — HEART RATE: 83 BPM | TEMPERATURE: 98.2 F | DIASTOLIC BLOOD PRESSURE: 79 MMHG | SYSTOLIC BLOOD PRESSURE: 125 MMHG

## 2025-01-02 DIAGNOSIS — N13.8 BENIGN PROSTATIC HYPERPLASIA WITH LOWER URINARY TRACT SYMPMS: ICD-10-CM

## 2025-01-02 DIAGNOSIS — R35.0 FREQUENCY OF MICTURITION: ICD-10-CM

## 2025-01-02 DIAGNOSIS — N40.1 BENIGN PROSTATIC HYPERPLASIA WITH LOWER URINARY TRACT SYMPMS: ICD-10-CM

## 2025-01-02 DIAGNOSIS — Z96.649 PRESENCE OF UNSPECIFIED ARTIFICIAL HIP JOINT: ICD-10-CM

## 2025-01-02 DIAGNOSIS — R31.0 GROSS HEMATURIA: ICD-10-CM

## 2025-01-02 DIAGNOSIS — D41.4 NEOPLASM OF UNCERTAIN BEHAVIOR OF BLADDER: ICD-10-CM

## 2025-01-02 DIAGNOSIS — Z98.890 OTHER SPECIFIED POSTPROCEDURAL STATES: Chronic | ICD-10-CM

## 2025-01-02 DIAGNOSIS — C67.2 MALIGNANT NEOPLASM OF LATERAL WALL OF BLADDER: ICD-10-CM

## 2025-01-02 PROCEDURE — 52000 CYSTOURETHROSCOPY: CPT

## 2025-01-02 PROCEDURE — 99213 OFFICE O/P EST LOW 20 MIN: CPT | Mod: 25

## 2025-01-02 RX ORDER — AMPICILLIN 500 MG/1
500 CAPSULE ORAL
Qty: 20 | Refills: 1 | Status: ACTIVE | COMMUNITY
Start: 2025-01-02 | End: 1900-01-01

## 2025-01-02 RX ORDER — SULFAMETHOXAZOLE AND TRIMETHOPRIM 800; 160 MG/1; MG/1
800-160 TABLET ORAL TWICE DAILY
Qty: 2 | Refills: 0 | Status: ACTIVE | OUTPATIENT
Start: 2025-01-02

## 2025-01-03 LAB
APPEARANCE: CLEAR
BACTERIA: ABNORMAL /HPF
BILIRUBIN URINE: NEGATIVE
BLOOD URINE: ABNORMAL
CAST: 0 /LPF
COARSE GRANULAR CASTS: PRESENT
COLOR: YELLOW
EPITHELIAL CELLS: 0 /HPF
GLUCOSE QUALITATIVE U: NEGATIVE MG/DL
KETONES URINE: NEGATIVE MG/DL
LEUKOCYTE ESTERASE URINE: ABNORMAL
MICROSCOPIC-UA: NORMAL
NITRITE URINE: NEGATIVE
PH URINE: 6
PROTEIN URINE: NEGATIVE MG/DL
RED BLOOD CELLS URINE: 15 /HPF
REVIEW: NORMAL
SPECIFIC GRAVITY URINE: 1.01
URINE CYTOLOGY: NORMAL
UROBILINOGEN URINE: 0.2 MG/DL
WHITE BLOOD CELLS URINE: 29 /HPF

## 2025-01-04 PROBLEM — N40.1 BPH WITH URINARY OBSTRUCTION: Status: ACTIVE | Noted: 2025-01-04

## 2025-01-04 PROBLEM — D41.4 BLADDER NEOPLASM OF UNCERTAIN MALIGNANT POTENTIAL: Status: ACTIVE | Noted: 2025-01-04

## 2025-01-05 PROBLEM — N39.0 ACUTE UTI: Status: ACTIVE | Noted: 2024-12-02

## 2025-01-05 LAB — BACTERIA UR CULT: ABNORMAL

## 2025-01-06 DIAGNOSIS — N40.1 BENIGN PROSTATIC HYPERPLASIA WITH LOWER URINARY TRACT SYMPTOMS: ICD-10-CM

## 2025-01-06 DIAGNOSIS — R31.0 GROSS HEMATURIA: ICD-10-CM

## 2025-01-06 DIAGNOSIS — D41.4 NEOPLASM OF UNCERTAIN BEHAVIOR OF BLADDER: ICD-10-CM

## 2025-01-15 RX ORDER — PHENAZOPYRIDINE 100 MG/1
100 TABLET, FILM COATED ORAL
Qty: 10 | Refills: 0 | Status: ACTIVE | COMMUNITY
Start: 2025-01-15 | End: 1900-01-01

## 2025-01-30 ENCOUNTER — NON-APPOINTMENT (OUTPATIENT)
Age: 85
End: 2025-01-30

## 2025-01-30 ENCOUNTER — APPOINTMENT (OUTPATIENT)
Dept: ELECTROPHYSIOLOGY | Facility: CLINIC | Age: 85
End: 2025-01-30

## 2025-01-30 PROCEDURE — 93296 REM INTERROG EVL PM/IDS: CPT

## 2025-01-30 PROCEDURE — 93294 REM INTERROG EVL PM/LDLS PM: CPT

## 2025-01-30 NOTE — ED ADULT NURSE NOTE - NS ED NURSE LEVEL OF CONSCIOUSNESS MENTAL STATUS
No significant episodes of palpitations reported per patient. Will continue to monitor.    Awake/Alert

## 2025-02-05 ENCOUNTER — APPOINTMENT (OUTPATIENT)
Dept: UROLOGY | Facility: CLINIC | Age: 85
End: 2025-02-05
Payer: MEDICARE

## 2025-02-05 VITALS
HEART RATE: 76 BPM | HEIGHT: 66 IN | RESPIRATION RATE: 16 BRPM | WEIGHT: 205 LBS | BODY MASS INDEX: 32.95 KG/M2 | DIASTOLIC BLOOD PRESSURE: 70 MMHG | SYSTOLIC BLOOD PRESSURE: 123 MMHG | OXYGEN SATURATION: 98 % | TEMPERATURE: 98 F

## 2025-02-05 DIAGNOSIS — T83.9XXA UNSPECIFIED COMPLICATION OF GENITOURINARY PROSTHETIC DEVICE, IMPLANT AND GRAFT, INITIAL ENCOUNTER: ICD-10-CM

## 2025-02-05 DIAGNOSIS — D41.4 NEOPLASM OF UNCERTAIN BEHAVIOR OF BLADDER: ICD-10-CM

## 2025-02-05 PROCEDURE — 99213 OFFICE O/P EST LOW 20 MIN: CPT

## 2025-02-26 ENCOUNTER — OUTPATIENT (OUTPATIENT)
Dept: OUTPATIENT SERVICES | Facility: HOSPITAL | Age: 85
LOS: 1 days | End: 2025-02-26
Payer: MEDICARE

## 2025-02-26 ENCOUNTER — APPOINTMENT (OUTPATIENT)
Dept: UROLOGY | Facility: CLINIC | Age: 85
End: 2025-02-26
Payer: MEDICARE

## 2025-02-26 VITALS — HEART RATE: 93 BPM | SYSTOLIC BLOOD PRESSURE: 118 MMHG | DIASTOLIC BLOOD PRESSURE: 74 MMHG

## 2025-02-26 DIAGNOSIS — Z97.8 PRESENCE OF OTHER SPECIFIED DEVICES: ICD-10-CM

## 2025-02-26 DIAGNOSIS — D41.4 NEOPLASM OF UNCERTAIN BEHAVIOR OF BLADDER: ICD-10-CM

## 2025-02-26 DIAGNOSIS — R35.0 FREQUENCY OF MICTURITION: ICD-10-CM

## 2025-02-26 DIAGNOSIS — Z98.890 OTHER SPECIFIED POSTPROCEDURAL STATES: Chronic | ICD-10-CM

## 2025-02-26 PROCEDURE — 51702 INSERT TEMP BLADDER CATH: CPT

## 2025-02-28 ENCOUNTER — OUTPATIENT (OUTPATIENT)
Dept: OUTPATIENT SERVICES | Facility: HOSPITAL | Age: 85
LOS: 1 days | End: 2025-02-28
Payer: MEDICARE

## 2025-02-28 ENCOUNTER — APPOINTMENT (OUTPATIENT)
Dept: UROLOGY | Facility: CLINIC | Age: 85
End: 2025-02-28
Payer: MEDICARE

## 2025-02-28 VITALS — DIASTOLIC BLOOD PRESSURE: 76 MMHG | OXYGEN SATURATION: 95 % | SYSTOLIC BLOOD PRESSURE: 120 MMHG | HEART RATE: 98 BPM

## 2025-02-28 DIAGNOSIS — Z98.890 OTHER SPECIFIED POSTPROCEDURAL STATES: Chronic | ICD-10-CM

## 2025-02-28 DIAGNOSIS — R35.0 FREQUENCY OF MICTURITION: ICD-10-CM

## 2025-02-28 DIAGNOSIS — T83.9XXA UNSPECIFIED COMPLICATION OF GENITOURINARY PROSTHETIC DEVICE, IMPLANT AND GRAFT, INITIAL ENCOUNTER: ICD-10-CM

## 2025-02-28 PROCEDURE — 51702 INSERT TEMP BLADDER CATH: CPT

## 2025-03-02 PROBLEM — Z97.8 CHRONIC INDWELLING FOLEY CATHETER: Status: ACTIVE | Noted: 2025-03-02

## 2025-03-03 ENCOUNTER — APPOINTMENT (OUTPATIENT)
Dept: UROLOGY | Facility: CLINIC | Age: 85
End: 2025-03-03

## 2025-03-03 DIAGNOSIS — D41.4 NEOPLASM OF UNCERTAIN BEHAVIOR OF BLADDER: ICD-10-CM

## 2025-03-03 DIAGNOSIS — T83.9XXA UNSPECIFIED COMPLICATION OF GENITOURINARY PROSTHETIC DEVICE, IMPLANT AND GRAFT, INITIAL ENCOUNTER: ICD-10-CM

## 2025-03-03 DIAGNOSIS — Z97.8 PRESENCE OF OTHER SPECIFIED DEVICES: ICD-10-CM

## 2025-03-05 ENCOUNTER — APPOINTMENT (OUTPATIENT)
Dept: UROLOGY | Facility: CLINIC | Age: 85
End: 2025-03-05
Payer: MEDICARE

## 2025-03-05 VITALS
DIASTOLIC BLOOD PRESSURE: 80 MMHG | BODY MASS INDEX: 36.32 KG/M2 | SYSTOLIC BLOOD PRESSURE: 120 MMHG | WEIGHT: 205 LBS | HEART RATE: 99 BPM | RESPIRATION RATE: 16 BRPM | HEIGHT: 63 IN | OXYGEN SATURATION: 93 %

## 2025-03-05 DIAGNOSIS — T83.9XXA UNSPECIFIED COMPLICATION OF GENITOURINARY PROSTHETIC DEVICE, IMPLANT AND GRAFT, INITIAL ENCOUNTER: ICD-10-CM

## 2025-03-05 DIAGNOSIS — N13.8 BENIGN PROSTATIC HYPERPLASIA WITH LOWER URINARY TRACT SYMPMS: ICD-10-CM

## 2025-03-05 DIAGNOSIS — N40.1 BENIGN PROSTATIC HYPERPLASIA WITH LOWER URINARY TRACT SYMPMS: ICD-10-CM

## 2025-03-05 PROCEDURE — 99213 OFFICE O/P EST LOW 20 MIN: CPT

## 2025-03-24 ENCOUNTER — APPOINTMENT (OUTPATIENT)
Dept: UROLOGY | Facility: CLINIC | Age: 85
End: 2025-03-24

## 2025-03-25 ENCOUNTER — OUTPATIENT (OUTPATIENT)
Dept: OUTPATIENT SERVICES | Facility: HOSPITAL | Age: 85
LOS: 1 days | End: 2025-03-25
Payer: MEDICARE

## 2025-03-25 VITALS
OXYGEN SATURATION: 98 % | HEART RATE: 95 BPM | HEIGHT: 71 IN | DIASTOLIC BLOOD PRESSURE: 68 MMHG | SYSTOLIC BLOOD PRESSURE: 94 MMHG | TEMPERATURE: 98 F | RESPIRATION RATE: 14 BRPM | WEIGHT: 203.93 LBS

## 2025-03-25 DIAGNOSIS — C67.2 MALIGNANT NEOPLASM OF LATERAL WALL OF BLADDER: ICD-10-CM

## 2025-03-25 DIAGNOSIS — Z98.890 OTHER SPECIFIED POSTPROCEDURAL STATES: Chronic | ICD-10-CM

## 2025-03-25 DIAGNOSIS — Z95.0 PRESENCE OF CARDIAC PACEMAKER: Chronic | ICD-10-CM

## 2025-03-25 DIAGNOSIS — R31.9 HEMATURIA, UNSPECIFIED: ICD-10-CM

## 2025-03-25 DIAGNOSIS — Z01.818 ENCOUNTER FOR OTHER PREPROCEDURAL EXAMINATION: ICD-10-CM

## 2025-03-25 DIAGNOSIS — Z95.0 PRESENCE OF CARDIAC PACEMAKER: ICD-10-CM

## 2025-03-25 DIAGNOSIS — Z96.642 PRESENCE OF LEFT ARTIFICIAL HIP JOINT: Chronic | ICD-10-CM

## 2025-03-25 LAB
ANION GAP SERPL CALC-SCNC: 13 MMOL/L — SIGNIFICANT CHANGE UP (ref 5–17)
BUN SERPL-MCNC: 34 MG/DL — HIGH (ref 7–23)
CALCIUM SERPL-MCNC: 9.4 MG/DL — SIGNIFICANT CHANGE UP (ref 8.4–10.5)
CHLORIDE SERPL-SCNC: 102 MMOL/L — SIGNIFICANT CHANGE UP (ref 96–108)
CO2 SERPL-SCNC: 22 MMOL/L — SIGNIFICANT CHANGE UP (ref 22–31)
CREAT SERPL-MCNC: 1.63 MG/DL — HIGH (ref 0.5–1.3)
EGFR: 41 ML/MIN/1.73M2 — LOW
EGFR: 41 ML/MIN/1.73M2 — LOW
GLUCOSE SERPL-MCNC: 97 MG/DL — SIGNIFICANT CHANGE UP (ref 70–99)
HCT VFR BLD CALC: 42.1 % — SIGNIFICANT CHANGE UP (ref 39–50)
HGB BLD-MCNC: 13.2 G/DL — SIGNIFICANT CHANGE UP (ref 13–17)
MCHC RBC-ENTMCNC: 26.2 PG — LOW (ref 27–34)
MCHC RBC-ENTMCNC: 31.4 G/DL — LOW (ref 32–36)
MCV RBC AUTO: 83.5 FL — SIGNIFICANT CHANGE UP (ref 80–100)
NRBC BLD AUTO-RTO: 0 /100 WBCS — SIGNIFICANT CHANGE UP (ref 0–0)
PLATELET # BLD AUTO: 365 K/UL — SIGNIFICANT CHANGE UP (ref 150–400)
POTASSIUM SERPL-MCNC: 4.3 MMOL/L — SIGNIFICANT CHANGE UP (ref 3.5–5.3)
POTASSIUM SERPL-SCNC: 4.3 MMOL/L — SIGNIFICANT CHANGE UP (ref 3.5–5.3)
RBC # BLD: 5.04 M/UL — SIGNIFICANT CHANGE UP (ref 4.2–5.8)
RBC # FLD: 16.6 % — HIGH (ref 10.3–14.5)
SODIUM SERPL-SCNC: 137 MMOL/L — SIGNIFICANT CHANGE UP (ref 135–145)
WBC # BLD: 11.31 K/UL — HIGH (ref 3.8–10.5)
WBC # FLD AUTO: 11.31 K/UL — HIGH (ref 3.8–10.5)

## 2025-03-25 PROCEDURE — 87086 URINE CULTURE/COLONY COUNT: CPT

## 2025-03-25 PROCEDURE — G0463: CPT

## 2025-03-25 PROCEDURE — 87186 SC STD MICRODIL/AGAR DIL: CPT

## 2025-03-25 PROCEDURE — 85027 COMPLETE CBC AUTOMATED: CPT

## 2025-03-25 PROCEDURE — 80048 BASIC METABOLIC PNL TOTAL CA: CPT

## 2025-03-25 PROCEDURE — 87077 CULTURE AEROBIC IDENTIFY: CPT

## 2025-03-25 RX ORDER — LIDOCAINE HCL/PF 10 MG/ML
0.2 VIAL (ML) INJECTION ONCE
Refills: 0 | Status: DISCONTINUED | OUTPATIENT
Start: 2025-04-15 | End: 2025-04-15

## 2025-03-25 RX ORDER — CEFAZOLIN SODIUM IN 0.9 % NACL 3 G/100 ML
2000 INTRAVENOUS SOLUTION, PIGGYBACK (ML) INTRAVENOUS ONCE
Refills: 0 | Status: COMPLETED | OUTPATIENT
Start: 2025-04-15 | End: 2025-04-15

## 2025-03-25 NOTE — H&P PST ADULT - MUSCULOSKELETAL
[facility]  General Surgery    Subjective:      HPI: Patient is a very pleasant 43-year-old male with a past medical history remarkable for morbid obesity with BMI 46.86 kg per metered square, hypertension, diabetes mellitus type 2, nephrolithiasis and history of sepsis with scrotal cellulitis in 2018. The patient is referred by his primary provider Donny Younger nurse practitioner for evaluation and management of a right inguinal hernia. He states that the hernia has been present for several months. It hurts when he lifts things sits in his car or truck. He works as a . He really does not have any light duty at his job. He denies any dysuria or diarrhea constipation. Patient Active Problem List    Diagnosis Date Noted    Type 2 diabetes with nephropathy ([address] 07/16/2018    Obesity, morbid ([address] 07/09/2018    Hypokalemia 06/18/2018    Cellulitis, scrotum 06/14/2018    Sepsis ([address] 06/14/2018    Cellulitis, perineum 06/14/2018    Hyperglycemia due to type 2 diabetes mellitus ([address] 06/14/2018    Diabetes mellitus, type 2 ([address] 09/25/2014    Kidney stone 09/26/2012    Motion sickness 09/25/2012     Past Medical History:   Diagnosis Date    Diabetes mellitus, type 2 ([address] 9/25/2014    Kidney stone 9/26/2012    Kidney stones       Past Surgical History:   Procedure Laterality Date    HX KNEE ARTHROSCOPY        Family History   Problem Relation Age of Onset    Cancer Father     Hypertension Father     Diabetes Father     Cancer Paternal Uncle     Hypertension Mother       Social History     Tobacco Use    Smoking status: Never Smoker    Smokeless tobacco: Never Used   Substance Use Topics    Alcohol use: Yes      Allergies   Allergen Reactions    Tylenol Cold [Kdi-Jemafbyds-Ef-Acetaminophen] Swelling       Prior to Admission medications    Medication Sig Start Date End Date Taking?  Authorizing Provider   aspirin 81 mg chewable tablet Take 81 mg by mouth daily. Yes Provider, Historical   amLODIPine-benazepril (LOTREL) 10-40 mg per capsule TAKE 1 CAPSULE BY MOUTH  DAILY 12/23/20  Yes Cal Ordoñez NP   fenofibrate nanocrystallized (TRICOR) 145 mg tablet TAKE 1 TABLET BY MOUTH  DAILY 12/23/20  Yes Cal Ordoñez NP   metFORMIN (GLUCOPHAGE) 500 mg tablet TAKE 1 TABLET BY MOUTH  TWICE DAILY 12/23/20  Yes Cal Ordoñez NP   OneTouch Ultra Blue Test Strip strip TEST TWO TIMES DAILY 9/23/20  Yes Miguel SKINNER NP   Lantus Solostar U-100 Insulin 100 unit/mL (3 mL) inpn INJECT SUBCUTANEOUSLY 20  UNITS NIGHTLY 7/8/20  Yes Cal Ordoñez NP   Insulin Needles, Disposable, 31 gauge x 5/16\" ndle Pen needled - To use with lantus solostar  Dx E11.65 2/17/20  Yes Cal Ordoñez NP   omeprazole (PRILOSEC) 20 mg capsule Take 1 Cap by mouth Daily (before breakfast). 3/11/19  Yes Cal Ordoñez NP   Blood-Glucose Meter monitoring kit One touch mini 9/10/14  Yes Cal Ordoñez NP       Review of Systems:    14 systems were reviewed. The results are as above in the HPI and otherwise negative. Objective:     Vitals:    02/16/21 1102 02/16/21 1107   BP: (!) 138/112 (!) 212/134   Pulse:  92   Resp: 18    Temp: 97.7 °F (36.5 °C)    SpO2: 98%    Weight: 152.4 kg (336 lb)    Height: 5' 11\" (1.803 m)        Physical Exam:  GENERAL: alert, cooperative, no distress, appears stated age,   EYE: conjunctivae/corneas clear. PERRL, EOM's intact. THROAT & NECK: normal and no erythema or exudates noted. ,    LYMPHATIC: Cervical, supraclavicular, and axillary nodes normal. ,   LUNG: clear to auscultation bilaterally,   HEART: regular rate and rhythm, S1, S2 normal, no murmur, click, rub or gallop,   ABDOMEN: soft, non-tender. Bowel sounds normal. No masses,  no organomegaly. Both testes are descended into the scrotum. No hernia palpable on the left. Reducible hernia palpable on the right.   EXTREMITIES:  extremities normal, atraumatic, no cyanosis or edema,   SKIN: Normal., NEUROLOGIC: AOx3. Cranial nerves 2-12 and sensation grossly intact. ,     Data Review: To be done    Mr. Shay Reilly has a reminder for a \"due or due soon\" health maintenance. I have asked that he contact his primary care provider for follow-up on this health maintenance. Impression:     · Patient with reducible but symptomatic right inguinal hernia.     Plan:     · Robot assisted laparoscopic right inguinal hernia repair with mesh   · Consent on chart  · Preoperative orders written    Signed By: Svetlana Faulkner MD     February 16, 2021 details… normal gait/strength 5/5 bilateral upper extremities

## 2025-03-25 NOTE — H&P PST ADULT - ASSESSMENT
DASI score: 4  DASI activity: deconditioned after hip surgery, uses walker.   Loose teeth or denture: denies

## 2025-03-25 NOTE — H&P PST ADULT - HISTORY OF PRESENT ILLNESS
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 Likely stable. Appears old), heart attack 30 years ago (per patient, no stents placed) He has had an indwelling urinary catheter for urinary retention that started after hip replacement surgery. He underwent a cystoscopy on 1/2/25 where a posterolateral papillary appearing lesion was seen. He is scheduled to undergo a cystoscopy and medium bladder tumor resection on 4/15.

## 2025-03-25 NOTE — H&P PST ADULT - PROBLEM SELECTOR PLAN 2
interrogation report in chart, done at [facility] 1/30/25  patient to undergo cardiac clearance with cardiologist on 4/2  instructed patient to remain on aspirin as prescribed

## 2025-03-25 NOTE — H&P PST ADULT - GENITOURINARY COMMENTS
indwelling urinary catheter in place attached to leg bag indwelling urinary catheter in place causing discomfort

## 2025-03-25 NOTE — H&P PST ADULT - PROBLEM SELECTOR PLAN 1
cystoscopy, medium bladder tumor resection on 4/15    PST labs sent per protocol, urine culture collected  Pre-op education provided - all questions answered

## 2025-03-25 NOTE — H&P PST ADULT - NSICDXPASTSURGICALHX_GEN_ALL_CORE_FT
PAST SURGICAL HISTORY:  Cardiac pacemaker     History of hemorrhoidectomy     History of hip replacement, total, left

## 2025-03-25 NOTE — H&P PST ADULT - ATTENDING COMMENTS
Pt treated appropriately for enterococcus UTI.  No other changes.  Aware he will awaken w pedroza again.    Ultimate goal is TURP, but need to bx the bladder lesion, which he is aware can be inflammatory, or benign from chronic pedroza edema.

## 2025-03-25 NOTE — H&P PST ADULT - NSICDXPASTMEDICALHX_GEN_ALL_CORE_FT
PAST MEDICAL HISTORY:  CAD (coronary artery disease)     History of myocardial infarction     History of TIA (transient ischemic attack)     HLD (hyperlipidemia)     HTN (hypertension)     Prostate cancer     Pulmonary sarcoidosis     Stage 3 chronic kidney disease     Transient amnesia

## 2025-03-26 PROBLEM — Z78.9 OTHER SPECIFIED HEALTH STATUS: Chronic | Status: INACTIVE | Noted: 2024-01-03 | Resolved: 2025-03-25

## 2025-03-26 PROBLEM — K59.09 OTHER CONSTIPATION: Chronic | Status: INACTIVE | Noted: 2024-01-03 | Resolved: 2025-03-25

## 2025-03-27 LAB
-  AMPICILLIN: SIGNIFICANT CHANGE UP
-  CIPROFLOXACIN: SIGNIFICANT CHANGE UP
-  LEVOFLOXACIN: SIGNIFICANT CHANGE UP
-  NITROFURANTOIN: SIGNIFICANT CHANGE UP
-  TETRACYCLINE: SIGNIFICANT CHANGE UP
-  VANCOMYCIN: SIGNIFICANT CHANGE UP
CULTURE RESULTS: ABNORMAL
METHOD TYPE: SIGNIFICANT CHANGE UP
ORGANISM # SPEC MICROSCOPIC CNT: ABNORMAL
ORGANISM # SPEC MICROSCOPIC CNT: ABNORMAL
SPECIMEN SOURCE: SIGNIFICANT CHANGE UP

## 2025-03-31 RX ORDER — FOSFOMYCIN TROMETHAMINE 3 G/1
3 POWDER ORAL
Qty: 2 | Refills: 0 | Status: ACTIVE | COMMUNITY
Start: 2025-03-31 | End: 1900-01-01

## 2025-04-02 DIAGNOSIS — R82.71 BACTERIURIA: ICD-10-CM

## 2025-04-02 RX ORDER — AMPICILLIN 500 MG/1
500 CAPSULE ORAL 4 TIMES DAILY
Qty: 28 | Refills: 0 | Status: ACTIVE | COMMUNITY
Start: 2025-04-02 | End: 1900-01-01

## 2025-04-15 ENCOUNTER — TRANSCRIPTION ENCOUNTER (OUTPATIENT)
Age: 85
End: 2025-04-15

## 2025-04-15 ENCOUNTER — RESULT REVIEW (OUTPATIENT)
Age: 85
End: 2025-04-15

## 2025-04-15 ENCOUNTER — APPOINTMENT (OUTPATIENT)
Dept: UROLOGY | Facility: HOSPITAL | Age: 85
End: 2025-04-15

## 2025-04-15 ENCOUNTER — OUTPATIENT (OUTPATIENT)
Dept: OUTPATIENT SERVICES | Facility: HOSPITAL | Age: 85
LOS: 1 days | End: 2025-04-15
Payer: MEDICARE

## 2025-04-15 VITALS
HEIGHT: 70.87 IN | OXYGEN SATURATION: 95 % | HEART RATE: 82 BPM | DIASTOLIC BLOOD PRESSURE: 72 MMHG | RESPIRATION RATE: 20 BRPM | WEIGHT: 203.93 LBS | SYSTOLIC BLOOD PRESSURE: 119 MMHG | TEMPERATURE: 97 F

## 2025-04-15 DIAGNOSIS — R31.0 GROSS HEMATURIA: ICD-10-CM

## 2025-04-15 DIAGNOSIS — Z96.642 PRESENCE OF LEFT ARTIFICIAL HIP JOINT: Chronic | ICD-10-CM

## 2025-04-15 DIAGNOSIS — Z98.890 OTHER SPECIFIED POSTPROCEDURAL STATES: Chronic | ICD-10-CM

## 2025-04-15 DIAGNOSIS — Z95.0 PRESENCE OF CARDIAC PACEMAKER: Chronic | ICD-10-CM

## 2025-04-15 DIAGNOSIS — C67.2 MALIGNANT NEOPLASM OF LATERAL WALL OF BLADDER: ICD-10-CM

## 2025-04-15 PROCEDURE — 93970 EXTREMITY STUDY: CPT | Mod: 26

## 2025-04-15 PROCEDURE — 88112 CYTOPATH CELL ENHANCE TECH: CPT | Mod: 26

## 2025-04-15 PROCEDURE — 52224 CYSTOSCOPY AND TREATMENT: CPT

## 2025-04-15 PROCEDURE — 88305 TISSUE EXAM BY PATHOLOGIST: CPT | Mod: 26

## 2025-04-15 RX ORDER — HEPARIN SODIUM 1000 [USP'U]/ML
5000 INJECTION INTRAVENOUS; SUBCUTANEOUS EVERY 8 HOURS
Refills: 0 | Status: DISCONTINUED | OUTPATIENT
Start: 2025-04-15 | End: 2025-04-29

## 2025-04-15 RX ORDER — ASPIRIN 325 MG
81 TABLET ORAL DAILY
Refills: 0 | Status: DISCONTINUED | OUTPATIENT
Start: 2025-04-16 | End: 2025-04-29

## 2025-04-15 RX ORDER — TAMSULOSIN HYDROCHLORIDE 0.4 MG/1
0.4 CAPSULE ORAL AT BEDTIME
Refills: 0 | Status: DISCONTINUED | OUTPATIENT
Start: 2025-04-15 | End: 2025-04-29

## 2025-04-15 RX ORDER — LOSARTAN POTASSIUM 100 MG/1
100 TABLET, FILM COATED ORAL DAILY
Refills: 0 | Status: DISCONTINUED | OUTPATIENT
Start: 2025-04-15 | End: 2025-04-29

## 2025-04-15 RX ORDER — FINASTERIDE 1 MG/1
1 TABLET, FILM COATED ORAL
Qty: 30 | Refills: 0
Start: 2025-04-15 | End: 2025-05-14

## 2025-04-15 RX ORDER — ACETAMINOPHEN 500 MG/5ML
650 LIQUID (ML) ORAL EVERY 6 HOURS
Refills: 0 | Status: DISCONTINUED | OUTPATIENT
Start: 2025-04-15 | End: 2025-04-29

## 2025-04-15 RX ORDER — FINASTERIDE 1 MG/1
5 TABLET, FILM COATED ORAL DAILY
Refills: 0 | Status: DISCONTINUED | OUTPATIENT
Start: 2025-04-15 | End: 2025-04-29

## 2025-04-15 RX ORDER — SODIUM CHLORIDE 9 G/1000ML
1000 INJECTION, SOLUTION INTRAVENOUS
Refills: 0 | Status: DISCONTINUED | OUTPATIENT
Start: 2025-04-15 | End: 2025-04-16

## 2025-04-15 RX ORDER — DOCUSATE SODIUM 100 MG
1 CAPSULE ORAL
Refills: 0 | DISCHARGE

## 2025-04-15 RX ORDER — AMPICILLIN SODIUM 1 G/1
1 INJECTION, POWDER, FOR SOLUTION INTRAMUSCULAR; INTRAVENOUS EVERY 8 HOURS
Refills: 0 | Status: DISCONTINUED | OUTPATIENT
Start: 2025-04-15 | End: 2025-04-29

## 2025-04-15 RX ADMIN — SODIUM CHLORIDE 50 MILLILITER(S): 9 INJECTION, SOLUTION INTRAVENOUS at 22:08

## 2025-04-15 RX ADMIN — HEPARIN SODIUM 5000 UNIT(S): 1000 INJECTION INTRAVENOUS; SUBCUTANEOUS at 22:02

## 2025-04-15 RX ADMIN — AMPICILLIN SODIUM 100 GRAM(S): 1 INJECTION, POWDER, FOR SOLUTION INTRAMUSCULAR; INTRAVENOUS at 22:00

## 2025-04-15 RX ADMIN — TAMSULOSIN HYDROCHLORIDE 0.4 MILLIGRAM(S): 0.4 CAPSULE ORAL at 22:02

## 2025-04-15 RX ADMIN — SODIUM CHLORIDE 30 MILLILITER(S): 9 INJECTION, SOLUTION INTRAVENOUS at 20:57

## 2025-04-15 NOTE — PRE-OP CHECKLIST - WEIGHT IN KG
Chief Complaint   Patient presents with    Fever       HISTORY OF PRESENT ILLNESS:  Roseline is a 36 year old female who presents for the above concerns. Here w/ her SO.    Dry cough, myalgias, fever, fatigue. Had one episode of vomiting yesterday in the night, otherwise no nausea or vomiting. No change in stools. Sx started 4 days ago. Less of an appetite, staying hydrated. Tmax 103.5F.  She states she is worried about how high the fever is.   OTC meds: tylenol, dayquil, nyquil  Sick contacts: none in particular   Denies: cp, sob, abd pain, ear pain, sore throat, urinary sx, rash  No hx of asthma or any lung dz.    Of note, she is immunosuppressed on Rinvoq for ulcerative colitis, she has been on this for 2 years.    PCP: Mila Wei DO    REVIEW OF SYSTEMS:  Full ROS reviewed and are negative except as noted above.  PMH, PFH, Soc Hx, Allergies reviewed today.    OBJECTIVE:  Visit Vitals  /65 (BP Location: LUE - Left upper extremity, Patient Position: Sitting, Cuff Size: Regular)   Pulse (!) 110   Temp (!) 102.5 °F (39.2 °C) (Oral)   Resp 14   Wt 70.3 kg (155 lb) Comment: patient reported   SpO2 95%      General:  NAD, well appearing female, nontoxic  HEENT:  TM clear and flat yoni, ext aud canals wnl, no pain w/ pull on pinnae, OP clear, no pharyngeal erythema, no tonsillar hypertrophy or exudate, no unilateral swelling, no PND visualized, no supraclavicular or cervical LAD  CV/Pulm: CTAB, no wheezes/rhonchi, Good airflow throughout, No increased WOB on RA, regular rhythm, tachycardic, non-displaced PMI  Derm/MSK:  Normal gait, no notable swelling or rash  Psych:  Cooperative, appropriate mood/affect, answers questions appropriately  -Ordered and rev COVID, influenza, RSV. Rev last GFR, Cr via Care Everywhere. Rev last progress note in [facility].    ASSESSMENT/PLAN:  1. Febrile illness    2. Acute cough    3. Myalgia      -COVID neg, influenza neg, RSV neg.    -Discussed with patient her illness is likely viral  in nature. However, with her being immunosuppressed she is at a higher risk for a bacterial infection. Offered labs, declined. Will start on augmentin, Disc SE/risk and how/when to take this. If fever does not resolved in the next 48hrs or if worsening sx, needs re-eval.   -Discussed supportive cares, gave a handout. Stay hydrated. Offered benzonatate and zofran, declined. Ok to cont w/ dayquil/nyquil.    Pt verbalizes understanding and agrees w/ plan. Gave strict return precautions. F/u w/ PCP     92.5

## 2025-04-15 NOTE — ASU DISCHARGE PLAN (ADULT/PEDIATRIC) - FINANCIAL ASSISTANCE
[facility] provides services at a reduced cost to those who are determined to be eligible through [facility]’s financial assistance program. Information regarding [facility]’s financial assistance program can be found by going to [email or web address] or by calling [phone].

## 2025-04-15 NOTE — ASU PATIENT PROFILE, ADULT - FALL HARM RISK - RISK INTERVENTIONS

## 2025-04-15 NOTE — ASU DISCHARGE PLAN (ADULT/PEDIATRIC) - ASU DC SPECIAL INSTRUCTIONSFT
•	Catheter: Some patients are sent home with a pedroza catheter while others go home urinating on their own. If you still have a catheter, the nurses will review instructions and care before you go home. For men, you will have a prescription for 1% lidocaine jelly to apply to the tip of your penis as needed for catheter related discomfort.  •	General: It is common to have blood in the urine after your procedure. It may be pink or even red; inform your doctor if you have a significant amount of clot in the urine or if you are unable to void at all or if your catheter stops draining. It is not uncommon to have some burning when you urinate, this will gradually improve. With a catheter in place, it is not uncommon to have occasional leakage of urine or blood around the catheter. Please call your urologist if this is excessive and/or the urine is not draining through the catheter into the bag.  •	Bathing: You may shower or bathe. If going home with Pedroza, shower only until catheter is removed.  •	Diet: You may resume your regular diet and regular medication regimen.  •	Pain: You may take Tylenol (acetaminophen) 650-975mg and/or Motrin (ibuprofen) 400-600mg, available over the counter, for pain every 6 hours as needed. Do not exceed 4000 milligrams of Tylenol (acetaminophen) daily. You may alternate these medications such that you take either one every 3 hours.  •	Antibiotics: You may be given a prescription for an antibiotic, please take this medication as instructed and be sure to complete entire course.  •	Stool softeners: Do not allow yourself to become constipated as straining will cause bleeding. Take stool softeners (ex. Colace) or a laxative (ex. Senekot, ExLax), available over the counter, if needed.  •	Activity: No heavy lifting or strenuous exercise until you are evaluated at your post-operative appointment. Otherwise, you may return to your usual level of activity.  •	Anticoagulation: If you are taking any blood thinning medications, please discuss with your urologist prior to restarting these medications unless otherwise specified.  •	Follow-up: If you did not already schedule your post-operative appointment, please call your urologist to schedule a follow-up appointment.  •	Call your urologist if: You have any bleeding that does not stop, inability to void >8 hours, fever over 100.4 F, chills, persistent nausea/vomiting, or if your pain is not controlled on your discharge pain medications.

## 2025-04-15 NOTE — ASU DISCHARGE PLAN (ADULT/PEDIATRIC) - CARE PROVIDER_API CALL
Asya Flynn.  Urology  [address] [place] [id number]  Phone: [phone]  Fax: [phone]  Follow Up Time: Routine

## 2025-04-16 VITALS — RESPIRATION RATE: 16 BRPM | HEART RATE: 87 BPM | OXYGEN SATURATION: 97 % | DIASTOLIC BLOOD PRESSURE: 66 MMHG

## 2025-04-16 PROCEDURE — 87086 URINE CULTURE/COLONY COUNT: CPT

## 2025-04-16 PROCEDURE — 93970 EXTREMITY STUDY: CPT

## 2025-04-16 PROCEDURE — 52224 CYSTOSCOPY AND TREATMENT: CPT

## 2025-04-16 PROCEDURE — 88305 TISSUE EXAM BY PATHOLOGIST: CPT

## 2025-04-16 PROCEDURE — 87186 SC STD MICRODIL/AGAR DIL: CPT

## 2025-04-16 PROCEDURE — 88112 CYTOPATH CELL ENHANCE TECH: CPT

## 2025-04-16 PROCEDURE — 87077 CULTURE AEROBIC IDENTIFY: CPT

## 2025-04-16 RX ADMIN — HEPARIN SODIUM 5000 UNIT(S): 1000 INJECTION INTRAVENOUS; SUBCUTANEOUS at 06:02

## 2025-04-16 RX ADMIN — AMPICILLIN SODIUM 100 GRAM(S): 1 INJECTION, POWDER, FOR SOLUTION INTRAMUSCULAR; INTRAVENOUS at 06:00

## 2025-04-16 NOTE — PROGRESS NOTE ADULT - ASSESSMENT
A/P: 84y Male s/p TURBT  23 hours stay given no ride  Ampicillin to cover preop culture + for E faecalis  Keep pedroza on DC (chronic)  DVT prophylaxis/OOB  Incentive spirometry  Strict I&O's  Analgesia and antiemetics as needed  Regular Diet      
84 year old male s/p TURBT, POD#1    -PO hydration  -discharge home with pedroza to leg bag  -continue antibiotics inpatient and upon discharge  -analgesia as needed  -OOB/DVT ppx

## 2025-04-16 NOTE — PROGRESS NOTE ADULT - SUBJECTIVE AND OBJECTIVE BOX
Post op Check    Pt seen and examined without complaints. Pain is controlled. Denies SOB/CP/N/V.     Vital Signs Last 24 Hrs  T(C): 36.4 (15 Apr 2025 19:50), Max: 36.4 (15 Apr 2025 19:50)  T(F): 97.5 (15 Apr 2025 19:50), Max: 97.5 (15 Apr 2025 19:50)  HR: 66 (15 Apr 2025 20:45) (66 - 82)  BP: 122/61 (15 Apr 2025 20:45) (101/50 - 126/60)  BP(mean): 85 (15 Apr 2025 20:45) (71 - 87)  RR: 18 (15 Apr 2025 20:30) (15 - 20)  SpO2: 100% (15 Apr 2025 20:45) (95% - 100%)    Parameters below as of 15 Apr 2025 20:30  Patient On (Oxygen Delivery Method): nasal cannula  O2 Flow (L/min): 2      I&O's Summary    15 Apr 2025 07:01  -  15 Apr 2025 21:28  --------------------------------------------------------  IN: 0 mL / OUT: 250 mL / NET: -250 mL        Physical Exam  Gen: NAD, A&Ox3  Pulm: No respiratory distress, no subcostal retractions  Abd: Soft, NT, ND  Back: no CVAT BL  : pedroza secured draining translucent strawberry colored urine, no clots                
The patient was seen and examined at bedside.  No acute events overnight and the patient is without acute complaints this AM.    T(C): 36.4 (04-16-25 @ 06:00), Max: 36.4 (04-15-25 @ 19:50)  HR: 75 (04-16-25 @ 06:00) (65 - 82)  BP: 106/58 (04-16-25 @ 06:00) (98/53 - 130/67)  RR: 16 (04-16-25 @ 06:00) (15 - 20)  SpO2: 96% (04-16-25 @ 06:00) (94% - 100%)  Wt(kg): --    Physical Exam:    General: NAD, A+Ox3  Abdomen: soft, non-tender, non-distended  Back: dressing clean/dry/intact      04-15 @ 07:01  -  04-16 @ 06:55  --------------------------------------------------------  IN: 1198 mL / OUT: 660 mL / NET: 538 mL      F - 510cc clear with a slight pink tinge

## 2025-04-17 LAB — SURGICAL PATHOLOGY STUDY: SIGNIFICANT CHANGE UP

## 2025-04-18 LAB
-  AMPICILLIN: SIGNIFICANT CHANGE UP
-  CIPROFLOXACIN: SIGNIFICANT CHANGE UP
-  LEVOFLOXACIN: SIGNIFICANT CHANGE UP
-  VANCOMYCIN: SIGNIFICANT CHANGE UP
CULTURE RESULTS: ABNORMAL
METHOD TYPE: SIGNIFICANT CHANGE UP
NON-GYNECOLOGICAL CYTOLOGY STUDY: SIGNIFICANT CHANGE UP
ORGANISM # SPEC MICROSCOPIC CNT: ABNORMAL
ORGANISM # SPEC MICROSCOPIC CNT: ABNORMAL
SPECIMEN SOURCE: SIGNIFICANT CHANGE UP

## 2025-04-19 ENCOUNTER — EMERGENCY (EMERGENCY)
Facility: HOSPITAL | Age: 85
LOS: 1 days | End: 2025-04-19
Attending: EMERGENCY MEDICINE
Payer: MEDICARE

## 2025-04-19 ENCOUNTER — NON-APPOINTMENT (OUTPATIENT)
Age: 85
End: 2025-04-19

## 2025-04-19 VITALS
DIASTOLIC BLOOD PRESSURE: 69 MMHG | RESPIRATION RATE: 18 BRPM | OXYGEN SATURATION: 95 % | HEART RATE: 94 BPM | SYSTOLIC BLOOD PRESSURE: 127 MMHG | TEMPERATURE: 99 F

## 2025-04-19 VITALS
OXYGEN SATURATION: 93 % | WEIGHT: 199.96 LBS | HEART RATE: 104 BPM | TEMPERATURE: 98 F | DIASTOLIC BLOOD PRESSURE: 76 MMHG | HEIGHT: 71 IN | SYSTOLIC BLOOD PRESSURE: 121 MMHG | RESPIRATION RATE: 20 BRPM

## 2025-04-19 DIAGNOSIS — Z96.642 PRESENCE OF LEFT ARTIFICIAL HIP JOINT: Chronic | ICD-10-CM

## 2025-04-19 DIAGNOSIS — Z95.0 PRESENCE OF CARDIAC PACEMAKER: Chronic | ICD-10-CM

## 2025-04-19 DIAGNOSIS — Z98.890 OTHER SPECIFIED POSTPROCEDURAL STATES: Chronic | ICD-10-CM

## 2025-04-19 PROCEDURE — 99285 EMERGENCY DEPT VISIT HI MDM: CPT

## 2025-04-19 PROCEDURE — 99282 EMERGENCY DEPT VISIT SF MDM: CPT

## 2025-04-19 RX ORDER — ACETAMINOPHEN 500 MG/5ML
975 LIQUID (ML) ORAL ONCE
Refills: 0 | Status: COMPLETED | OUTPATIENT
Start: 2025-04-19 | End: 2025-04-19

## 2025-04-19 NOTE — ED PROVIDER NOTE - NSFOLLOWUPINSTRUCTIONS_ED_ALL_ED_FT
Today you were evaluated in [facility] urgency department for Salas obstruction.  While you are here we exchange her Salas.    We recommend that you return to the emergency department if your Salas stops draining, if you have abdominal pain, fever, chills, body aches, back pain.    We recommend that you follow-up with Dr. Bradford to discuss results of the biopsy when they return.

## 2025-04-19 NOTE — ED PROVIDER NOTE - PHYSICAL EXAMINATION
Physical Exam:  Gen: NAD, AOx3, non-toxic appearing, able to ambulate without assistance  Head: NCAT  HEENT: EOMI, PEERLA, normal conjunctiva, tongue midline, oral mucosa moist  Lung: CTAB, no respiratory distress, no wheezes/rhonchi/rales B/L, speaking in full sentences  CV: RRR, no murmurs, rubs or gallops  Abd: lower abdominal ttp w/o rebound/guarding, no cva ttp   MSK: no visible deformities, ROM normal in UE/LE, no back pain  Neuro: No focal sensory or motor deficits  Skin: Warm, well perfused, no rash, no leg swelling  Psych: normal affect, calm

## 2025-04-19 NOTE — ED PROVIDER NOTE - OBJECTIVE STATEMENT
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 Flynn,  sarcoidosis CAD w/ no stents presenting to ED for decreased pedroza output since this morning.  Patient states Pedroza was draining up until approximately 6:30 AM.  Patient endorsing lower abdominal pain/distention.  Denies vomiting, fever, chills, body aches.  Patient has been passing bowel movements and flatulence.

## 2025-04-19 NOTE — ED PROVIDER NOTE - PATIENT PORTAL LINK FT
You can access the FollowMyHealth Patient Portal offered by [facility] by registering at the following website: [email or web address]. By joining [facility]’s FollowMyHealth portal, you will also be able to view your health information using other applications (apps) compatible with our system.

## 2025-04-19 NOTE — ED ADULT NURSE NOTE - OBJECTIVE STATEMENT
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 Pt reports he woke up this morning with his pedroza draining and then around 0630 he noted that it stopped draining and started having abdominal pain with distention. Upon arrival, pt is A&OX4, satting well on RA. Afebrile orally. Pt appears uncomfortable. Abdomen is tender to palpation in lower quadrants. Denies headache, dizziness, vision changes, chest pain, shortness of breath, nausea, vomiting, diarrhea, fevers, chills, dysuria, hematuria, recent illness travel or fall. 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 Pt has had chronic Pedroza since september. Pt had procedure on Tuesday and they replaced the Pedroza after the procedure. MD told pt to come to ED if he had decreased UO. Pt reports he woke up this morning with his pedroza draining and then around 0630 he noted that it stopped draining and started having abdominal pain with distention. Upon arrival, pt is A&OX4, satting well on RA. Afebrile orally. Pt appears uncomfortable. Abdomen is tender to palpation in lower quadrants. Denies headache, dizziness, vision changes, chest pain, shortness of breath, nausea, vomiting, diarrhea, fevers, chills, dysuria, hematuria, recent illness travel or fall.

## 2025-04-19 NOTE — ED ADULT NURSE NOTE - NSFALLHARMRISKINTERV_ED_ALL_ED

## 2025-04-19 NOTE — ED PROVIDER NOTE - CLINICAL SUMMARY MEDICAL DECISION MAKING FREE TEXT BOX
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 Flynn,  sarcoidosis CAD w/ no stents presenting to ED for decreased pedroza output since this morning.  Patient states Pedroza was draining up until approximately 6:30 AM. On arrival to emergency department patient is normotensive, heart rate 104 bpm, nonfebrile, saturating 93% on room air.  On physical examination patient appears slightly uncomfortable secondary to pain, lungs are clear to auscultation bilaterally without wheezing rales or rhonchi, no cardiac murmurs or rubs, abdomen with minimal lower quadrant tenderness palpation without rebound or guarding, no CVA tenderness.  Pedroza bag is empty.  1-2+ pitting edema bilateral lower extremities.  Differential includes was not limited to Pedroza obstruction, less likely acute renal failure, less likely cystitis.  Plan to flush Pedroza, if does not work will exchange Pedroza, will also order CBC and CMP as well as urine studies.  If complication we will consult urology team.

## 2025-04-19 NOTE — ED ADULT NURSE REASSESSMENT NOTE - NS ED NURSE REASSESS COMMENT FT1
Salas flushed and now draining to gravity. Urine appears clear, yellow. No blood or clots noted. MD Gamboa made aware

## 2025-04-19 NOTE — ED ADULT TRIAGE NOTE - BANDS:
Fall Risk; Rifampin Counseling: I discussed with the patient the risks of rifampin including but not limited to liver damage, kidney damage, red-orange body fluids, nausea/vomiting and severe allergy.

## 2025-04-19 NOTE — ED PROVIDER NOTE - PROGRESS NOTE DETAILS
Cooper HELTON, PGY-2;  US bladder shows 270cc in bladder. Additional ~200 cc output in leg bag, clear yellow urine. Bladder empty on bedside POCUS, Pedroza balloon in place. At this time Pedroza appears to be functioning well. Pedroza is only 2 days old, will leave in place. Leg bag changed. No abdominal pain. No fevers. No urine outpt between 9:30AM and Noon. Will defer labs at this time as no s/s of sepsis. RANCHO less likely, pedroza now functioning and patient can orally hydrate. NIKOLAY.

## 2025-04-23 ENCOUNTER — APPOINTMENT (OUTPATIENT)
Dept: UROLOGY | Facility: CLINIC | Age: 85
End: 2025-04-23

## 2025-04-23 ENCOUNTER — APPOINTMENT (OUTPATIENT)
Dept: PULMONOLOGY | Facility: CLINIC | Age: 85
End: 2025-04-23
Payer: MEDICARE

## 2025-04-23 VITALS
RESPIRATION RATE: 16 BRPM | SYSTOLIC BLOOD PRESSURE: 105 MMHG | WEIGHT: 205 LBS | HEIGHT: 63 IN | DIASTOLIC BLOOD PRESSURE: 77 MMHG | BODY MASS INDEX: 36.32 KG/M2 | HEART RATE: 93 BPM | OXYGEN SATURATION: 93 %

## 2025-04-23 DIAGNOSIS — Z97.8 PRESENCE OF OTHER SPECIFIED DEVICES: ICD-10-CM

## 2025-04-23 DIAGNOSIS — N30.80 OTHER CYSTITIS W/OUT HEMATURIA: ICD-10-CM

## 2025-04-23 PROCEDURE — 99214 OFFICE O/P EST MOD 30 MIN: CPT

## 2025-04-30 ENCOUNTER — APPOINTMENT (OUTPATIENT)
Dept: PULMONOLOGY | Facility: CLINIC | Age: 85
End: 2025-04-30
Payer: MEDICARE

## 2025-04-30 VITALS
OXYGEN SATURATION: 94 % | TEMPERATURE: 98.1 F | HEART RATE: 79 BPM | BODY MASS INDEX: 37.21 KG/M2 | HEIGHT: 63 IN | SYSTOLIC BLOOD PRESSURE: 123 MMHG | WEIGHT: 210 LBS | DIASTOLIC BLOOD PRESSURE: 81 MMHG

## 2025-04-30 DIAGNOSIS — D86.9 SARCOIDOSIS, UNSPECIFIED: ICD-10-CM

## 2025-04-30 DIAGNOSIS — R05.3 CHRONIC COUGH: ICD-10-CM

## 2025-04-30 DIAGNOSIS — Z76.89 PERSONS ENCOUNTERING HEALTH SERVICES IN OTHER SPECIFIED CIRCUMSTANCES: ICD-10-CM

## 2025-04-30 DIAGNOSIS — Z96.649 PRESENCE OF UNSPECIFIED ARTIFICIAL HIP JOINT: ICD-10-CM

## 2025-04-30 PROCEDURE — G2211 COMPLEX E/M VISIT ADD ON: CPT

## 2025-04-30 PROCEDURE — 99215 OFFICE O/P EST HI 40 MIN: CPT

## 2025-05-01 PROBLEM — Z76.89 SLEEP CONCERN: Status: ACTIVE | Noted: 2025-05-01

## 2025-05-02 LAB — ACE BLD-CCNC: 64 U/L

## 2025-05-04 PROBLEM — N30.80: Status: ACTIVE | Noted: 2025-05-04

## 2025-05-05 ENCOUNTER — APPOINTMENT (OUTPATIENT)
Dept: ELECTROPHYSIOLOGY | Facility: CLINIC | Age: 85
End: 2025-05-05

## 2025-05-05 ENCOUNTER — NON-APPOINTMENT (OUTPATIENT)
Age: 85
End: 2025-05-05

## 2025-05-05 LAB — IL2 SERPL-MCNC: 2868.2 PG/ML

## 2025-05-05 PROCEDURE — 93294 REM INTERROG EVL PM/LDLS PM: CPT

## 2025-05-05 PROCEDURE — 93296 REM INTERROG EVL PM/IDS: CPT

## 2025-05-12 ENCOUNTER — NON-APPOINTMENT (OUTPATIENT)
Age: 85
End: 2025-05-12

## 2025-05-14 ENCOUNTER — APPOINTMENT (OUTPATIENT)
Dept: PULMONOLOGY | Facility: CLINIC | Age: 85
End: 2025-05-14
Payer: MEDICARE

## 2025-05-14 VITALS
HEART RATE: 87 BPM | SYSTOLIC BLOOD PRESSURE: 105 MMHG | RESPIRATION RATE: 15 BRPM | BODY MASS INDEX: 28.7 KG/M2 | DIASTOLIC BLOOD PRESSURE: 70 MMHG | WEIGHT: 205 LBS | OXYGEN SATURATION: 97 % | HEIGHT: 71 IN

## 2025-05-14 DIAGNOSIS — D86.9 SARCOIDOSIS, UNSPECIFIED: ICD-10-CM

## 2025-05-14 DIAGNOSIS — R91.8 OTHER NONSPECIFIC ABNORMAL FINDING OF LUNG FIELD: ICD-10-CM

## 2025-05-14 DIAGNOSIS — R05.9 COUGH, UNSPECIFIED: ICD-10-CM

## 2025-05-14 PROCEDURE — 85018 HEMOGLOBIN: CPT | Mod: QW

## 2025-05-14 PROCEDURE — 94727 GAS DIL/WSHOT DETER LNG VOL: CPT

## 2025-05-14 PROCEDURE — 99214 OFFICE O/P EST MOD 30 MIN: CPT | Mod: 25

## 2025-05-14 PROCEDURE — 94729 DIFFUSING CAPACITY: CPT

## 2025-05-14 PROCEDURE — 94010 BREATHING CAPACITY TEST: CPT

## 2025-05-14 PROCEDURE — ZZZZZ: CPT

## 2025-05-14 RX ORDER — FLUTICASONE FUROATE 200 UG/1
200 POWDER RESPIRATORY (INHALATION)
Qty: 1 | Refills: 5 | Status: ACTIVE | COMMUNITY
Start: 2025-05-14 | End: 1900-01-01

## 2025-06-12 ENCOUNTER — APPOINTMENT (OUTPATIENT)
Dept: UROLOGY | Facility: CLINIC | Age: 85
End: 2025-06-12
Payer: MEDICARE

## 2025-06-12 ENCOUNTER — OUTPATIENT (OUTPATIENT)
Dept: OUTPATIENT SERVICES | Facility: HOSPITAL | Age: 85
LOS: 1 days | End: 2025-06-12
Payer: MEDICARE

## 2025-06-12 VITALS
RESPIRATION RATE: 15 BRPM | SYSTOLIC BLOOD PRESSURE: 83 MMHG | TEMPERATURE: 97.5 F | DIASTOLIC BLOOD PRESSURE: 58 MMHG | HEART RATE: 94 BPM | OXYGEN SATURATION: 100 %

## 2025-06-12 VITALS — HEART RATE: 56 BPM | DIASTOLIC BLOOD PRESSURE: 61 MMHG | SYSTOLIC BLOOD PRESSURE: 94 MMHG | RESPIRATION RATE: 15 BRPM

## 2025-06-12 DIAGNOSIS — Z98.890 OTHER SPECIFIED POSTPROCEDURAL STATES: Chronic | ICD-10-CM

## 2025-06-12 DIAGNOSIS — Z96.642 PRESENCE OF LEFT ARTIFICIAL HIP JOINT: Chronic | ICD-10-CM

## 2025-06-12 DIAGNOSIS — Z95.0 PRESENCE OF CARDIAC PACEMAKER: Chronic | ICD-10-CM

## 2025-06-12 DIAGNOSIS — R35.0 FREQUENCY OF MICTURITION: ICD-10-CM

## 2025-06-12 PROCEDURE — 51702 INSERT TEMP BLADDER CATH: CPT

## 2025-06-13 DIAGNOSIS — Z97.8 PRESENCE OF OTHER SPECIFIED DEVICES: ICD-10-CM

## 2025-07-09 ENCOUNTER — APPOINTMENT (OUTPATIENT)
Dept: UROLOGY | Facility: HOSPITAL | Age: 85
End: 2025-07-09

## 2025-07-16 ENCOUNTER — APPOINTMENT (OUTPATIENT)
Dept: PULMONOLOGY | Facility: CLINIC | Age: 85
End: 2025-07-16

## 2025-08-06 ENCOUNTER — NON-APPOINTMENT (OUTPATIENT)
Age: 85
End: 2025-08-06

## 2025-08-06 ENCOUNTER — APPOINTMENT (OUTPATIENT)
Dept: ELECTROPHYSIOLOGY | Facility: CLINIC | Age: 85
End: 2025-08-06
Payer: MEDICARE

## 2025-08-06 PROCEDURE — 93296 REM INTERROG EVL PM/IDS: CPT

## 2025-08-06 PROCEDURE — 93294 REM INTERROG EVL PM/LDLS PM: CPT

## (undated) DEVICE — DRAPE LINGEMAN TUR

## (undated) DEVICE — PREP BETADINE KIT

## (undated) DEVICE — FOLEY CATH 2-WAY 18FR 5CC LATEX COUDE RED

## (undated) DEVICE — SOL IRR POUR H2O 1500ML

## (undated) DEVICE — WARMING BLANKET UPPER ADULT

## (undated) DEVICE — ELCTR PLASMA ROLLER 24FR 12-30 DEG

## (undated) DEVICE — SYR ASEPTO

## (undated) DEVICE — POSITIONER FOAM EGG CRATE ULNAR 2PCS (PINK)

## (undated) DEVICE — ELCTR PLASMA LOOP MEDIUM ANGLED 24FR 12-30 DEG

## (undated) DEVICE — BAG URINE W METER 2L

## (undated) DEVICE — POSITIONER FOAM HEADREST (PINK)

## (undated) DEVICE — SOL IRR BAG NS 0.9% 3000ML

## (undated) DEVICE — VENODYNE/SCD SLEEVE CALF MEDIUM

## (undated) DEVICE — SOL IRR BAG H2O 3000ML

## (undated) DEVICE — PACK CYSTO

## (undated) DEVICE — TUBING THERMADX UROLOGY

## (undated) DEVICE — GLV 6.5 PROTEXIS (WHITE)

## (undated) DEVICE — ELCTR PLASMA BUTTON OVAL 24FR 12-30 DEG

## (undated) DEVICE — GOWN TRIMAX LG

## (undated) DEVICE — ELCTR GROUNDING PAD ADULT COVIDIEN

## (undated) DEVICE — SYR IRRIGATION PISTON 60CC